# Patient Record
Sex: FEMALE | Race: WHITE | NOT HISPANIC OR LATINO | Employment: PART TIME | URBAN - METROPOLITAN AREA
[De-identification: names, ages, dates, MRNs, and addresses within clinical notes are randomized per-mention and may not be internally consistent; named-entity substitution may affect disease eponyms.]

---

## 2019-01-24 ENCOUNTER — OFFICE VISIT CONVERTED (OUTPATIENT)
Dept: SURGERY | Facility: CLINIC | Age: 24
End: 2019-01-24
Attending: PHYSICIAN ASSISTANT

## 2019-01-24 ENCOUNTER — HOSPITAL ENCOUNTER (OUTPATIENT)
Dept: SURGERY | Facility: CLINIC | Age: 24
Discharge: HOME | End: 2019-01-24
Attending: PHYSICIAN ASSISTANT

## 2019-01-26 LAB — BACTERIA UR CULT: NORMAL

## 2019-06-19 ENCOUNTER — HOSPITAL ENCOUNTER (OUTPATIENT)
Dept: URGENT CARE | Facility: CLINIC | Age: 24
Discharge: HOME | End: 2019-06-19

## 2019-06-21 ENCOUNTER — CONVERSION ENCOUNTER (OUTPATIENT)
Dept: FAMILY MEDICINE CLINIC | Facility: CLINIC | Age: 24
End: 2019-06-21

## 2019-06-21 ENCOUNTER — OFFICE VISIT CONVERTED (OUTPATIENT)
Dept: FAMILY MEDICINE CLINIC | Facility: CLINIC | Age: 24
End: 2019-06-21
Attending: NURSE PRACTITIONER

## 2019-06-21 LAB — BACTERIA SPEC AEROBE CULT: NORMAL

## 2019-09-11 ENCOUNTER — HOSPITAL ENCOUNTER (OUTPATIENT)
Dept: URGENT CARE | Facility: CLINIC | Age: 24
Discharge: HOME | End: 2019-09-11
Attending: PHYSICIAN ASSISTANT

## 2019-12-23 ENCOUNTER — HOSPITAL ENCOUNTER (OUTPATIENT)
Dept: LAB | Facility: HOSPITAL | Age: 24
Discharge: HOME | End: 2019-12-23
Attending: NURSE PRACTITIONER

## 2019-12-23 ENCOUNTER — OFFICE VISIT CONVERTED (OUTPATIENT)
Dept: FAMILY MEDICINE CLINIC | Facility: CLINIC | Age: 24
End: 2019-12-23
Attending: NURSE PRACTITIONER

## 2019-12-23 LAB — HCG INTACT+B SERPL-ACNC: 823.6 M[IU]/ML (ref 0–5)

## 2020-01-21 ENCOUNTER — HOSPITAL ENCOUNTER (OUTPATIENT)
Dept: OTHER | Facility: HOSPITAL | Age: 25
Discharge: HOME | End: 2020-01-21
Attending: OBSTETRICS & GYNECOLOGY

## 2020-01-23 LAB — BACTERIA UR CULT: NORMAL

## 2020-07-06 ENCOUNTER — HOSPITAL ENCOUNTER (OUTPATIENT)
Dept: INFUSION THERAPY | Facility: HOSPITAL | Age: 25
Discharge: HOME | End: 2020-07-06
Attending: OBSTETRICS & GYNECOLOGY

## 2020-07-06 LAB
ABO GROUP BLD: NORMAL
BLD GP AB SCN SERPL QL: NORMAL
CONV ABD CONTROL: NORMAL
RH BLD: NORMAL

## 2021-03-23 ENCOUNTER — OFFICE VISIT CONVERTED (OUTPATIENT)
Dept: FAMILY MEDICINE CLINIC | Facility: CLINIC | Age: 26
End: 2021-03-23
Attending: STUDENT IN AN ORGANIZED HEALTH CARE EDUCATION/TRAINING PROGRAM

## 2021-03-23 ENCOUNTER — HOSPITAL ENCOUNTER (OUTPATIENT)
Dept: LAB | Facility: HOSPITAL | Age: 26
Discharge: HOME | End: 2021-03-23
Attending: STUDENT IN AN ORGANIZED HEALTH CARE EDUCATION/TRAINING PROGRAM

## 2021-03-23 ENCOUNTER — CONVERSION ENCOUNTER (OUTPATIENT)
Dept: FAMILY MEDICINE CLINIC | Facility: CLINIC | Age: 26
End: 2021-03-23

## 2021-03-23 LAB
ALBUMIN SERPL-MCNC: 4.3 G/DL (ref 3.5–5)
ALBUMIN/GLOB SERPL: 1.7 {RATIO} (ref 1.4–2.6)
ALP SERPL-CCNC: 94 U/L (ref 42–98)
ALT SERPL-CCNC: 14 U/L (ref 10–40)
ANION GAP SERPL CALC-SCNC: 8 MMOL/L (ref 8–19)
AST SERPL-CCNC: 20 U/L (ref 15–50)
BILIRUB SERPL-MCNC: 0.55 MG/DL (ref 0.2–1.3)
BUN SERPL-MCNC: 10 MG/DL (ref 5–25)
BUN/CREAT SERPL: 17 {RATIO} (ref 6–20)
CALCIUM SERPL-MCNC: 9 MG/DL (ref 8.7–10.4)
CHLORIDE SERPL-SCNC: 105 MMOL/L (ref 99–111)
CONV CO2: 27 MMOL/L (ref 22–32)
CONV TOTAL PROTEIN: 6.9 G/DL (ref 6.3–8.2)
CREAT UR-MCNC: 0.59 MG/DL (ref 0.5–0.9)
GFR SERPLBLD BASED ON 1.73 SQ M-ARVRAT: >60 ML/MIN/{1.73_M2}
GLOBULIN UR ELPH-MCNC: 2.6 G/DL (ref 2–3.5)
GLUCOSE SERPL-MCNC: 80 MG/DL (ref 65–99)
HCG UR QL: NEGATIVE
LIPASE SERPL-CCNC: 23 U/L (ref 5–51)
OSMOLALITY SERPL CALC.SUM OF ELEC: 280 MOSM/KG (ref 273–304)
POTASSIUM SERPL-SCNC: 3.9 MMOL/L (ref 3.5–5.3)
SODIUM SERPL-SCNC: 136 MMOL/L (ref 135–147)

## 2021-04-05 ENCOUNTER — HOSPITAL ENCOUNTER (OUTPATIENT)
Dept: GENERAL RADIOLOGY | Facility: HOSPITAL | Age: 26
Discharge: HOME | End: 2021-04-05
Attending: STUDENT IN AN ORGANIZED HEALTH CARE EDUCATION/TRAINING PROGRAM

## 2021-05-14 VITALS
TEMPERATURE: 98.3 F | HEART RATE: 71 BPM | OXYGEN SATURATION: 98 % | WEIGHT: 126 LBS | RESPIRATION RATE: 16 BRPM | BODY MASS INDEX: 19.1 KG/M2 | DIASTOLIC BLOOD PRESSURE: 76 MMHG | SYSTOLIC BLOOD PRESSURE: 110 MMHG | HEIGHT: 68 IN

## 2021-05-14 NOTE — PROGRESS NOTES
Progress Note      Patient Name: Dixie Lawrence   Patient ID: [id number]   Sex: Female   YOB: 1995    Primary Care Provider: Zia ALBERT   Referring Provider: Zari WILSON    Visit Date: March 23, 2021    Provider: Sanjana Ha MD   [place] [facility] [place]   [place] Address: [address] [place] [address], [address] [place] [address]  [id number]   Location Phone: [phone]          Chief Complaint     Vomiting at night       History Of Present Illness  Dixie Lawrence is a 26 year old /White female who presents for evaluation and treatment of:      26 years old female comes to the clinic today for an acute visit.    Patient is complaining of on and off few months history of nausea and vomiting after heavy/greasy food intake.  Patient denies any fever, any abdominal pain or any urinary symptoms.  Patient reports symptoms get worse after fatty food.  Patient reports 2 days ago she had a same symptoms after she ate pizza in the evening.  Nonbloody nonbilious vomiting, normal bowel movements, no abdominal pain or fever or any urinary symptoms noted.    Patient reports she is breast-feeding/last delivery August/2020, started her menstrual cycle 3 days ago.       Past Medical History  Disease Name Date Onset Notes   Allergic rhinitis, chronic --  --    Bladder disorder --  --    Chronic UTI --  --    Dysuria --  --    Mood disorder --  --    Seasonal allergies --  --          Past Surgical History  Procedure Name Date Notes   *I have had no surgeries --  --    I have had no surgeries --  --          Allergy List  Allergen Name Date Reaction Notes   PENICILLINS --  --  --          Family Medical History  Disease Name Relative/Age Notes   Prostate cancer Grandfather (maternal)/   Grandfather (maternal)   Renal Cancer Father/40s  Grandfather (maternal)/70s   Father/40s; Grandfather (maternal)/70s   *No Known Family History  --          Social History  Finding Status  "Start/Stop Quantity Notes   Alcohol Current some day --/-- --  drinks monthly; wine, beer and liquor   Alcohol Use Current some day --/-- --  rarely drinks, less than 1 drink per day, has been drinking for less than 1 year   Caffeine Current every day --/-- --  drinks regularly; coffee, tea and soft drinks; 1-2 times per day   lives with other --  --/-- --  --    lives with parents --  --/-- --  --    Recreational Drug Use Never --/-- --  no   Second hand smoke exposure Current some day --/-- --  yes   Single. --  --/-- --  --    Student. --  --/-- --  --    Tobacco Never --/-- --  never a smoker  never smoker   Working --  --/-- --  --          Immunizations  NameDate Admin Mfg Trade Name Lot Number Route Inj VIS Given VIS Publication   InfluenzaRefused 06/21/2019 NE Not Entered  NE NE     Comments:          Review of Systems  · Constitutional  o Denies  o : fatigue, fever  · Eyes  o Denies  o : discharge from eye, redness  · HENT  o Denies  o : headaches, congestion  · Cardiovascular  o Denies  o : chest pain, palpitations  · Respiratory  o Denies  o : shortness of breath, wheezing, cough  · Gastrointestinal  o Admits  o : nausea, vomiting  o Denies  o : diarrhea, constipation  · Genitourinary  o Denies  o : dysuria, hematuria  · Integument  o Denies  o : rash, new skin lesions  · Neurologic  o Denies  o : altered mental status, seizures  · Musculoskeletal  o Denies  o : weakness, joint swelling  · Psychiatric  o Denies  o : anxiety, depression  · Heme-Lymph  o Denies  o : lymph node enlargement, tenderness      Vitals  Date Time BP Position Site L\R Cuff Size HR RR TEMP (F) WT  HT  BMI kg/m2 BSA m2 O2 Sat FR L/min FiO2 HC       03/23/2021 08:53 /76 Sitting    71 - R 16 98.3 125lbs 16oz 5'  8\" 19.16 1.66 98 %  21%          Physical Examination  · Constitutional  o Appearance  o : alert, in no acute distress, well developed, well-nourished  · Respiratory  o Auscultation of Lungs  o : normal breath sounds " throughout  · Cardiovascular  o Heart  o : Regular rate and rhythm, Normal S1,S2 , No cardiac murmers, No S3 or S4 gallop or rubs  · Gastrointestinal  o Abdominal Examination  o : abdomen soft, nontender, non distended, no rigidity, gaurding, rebound tenderness, no ventral or inguinal hernias present  o Liver and spleen  o : no hepatomegaly present, liver nontender to palpation, spleen not palpable  · Skin and Subcutaneous Tissue  o General Inspection  o : no rashes , or lesions present, normal skin color, warm and dry  o Digits and Nails  o : no clubbing, cyanosis, deformities or edema present, normal appearing nails  · Neurologic  o Mental Status Examination  o : alert and oriented to time, place, and person., Cranial Nerves: grossly intact,           Results  · In-Office Procedures  o Lab procedure  § Pregnancy Test (45006)   § B-HCG Ur Ql: Negative   § Internal Control Verified?: Yes       Assessment  · Nausea & vomiting     787.01/R11.2  · Food sensitivity with gastrointestinal symptoms       Other adverse food reactions, not elsewhere classified, initial encounter     693.1/T78.1XXA  · Breast feeding status of mother     V24.1/Z39.1       --Educated on lifestyle modification, diet change discussed with patient  --We will get the CMP/lipase and ultrasound of the gallbladder to rule out any possibility of gallstones  --Urine pregnancy is negative in the office   --Patient was advised to call the clinic if no improvement after diet modification or worsening or no improvement; consider as needed treatment if needed       Plan  · Orders  o CMP [facility] (18061) - 787.01/R11.2, 693.1/T78.1XXA - 03/23/2021  o ACO-14: Influenza immunization administered or previously received [facility] () - - 03/23/2021  o ACO-39: Current medications updated and reviewed (, 1159F) - - 03/23/2021  o Ultrasound liver and gall bladder (46324) - 787.01/R11.2, 693.1/T78.1XXA - 03/23/2021  o Lipase (47459) - 787.01/R11.2, 693.1/T78.1XXA -  03/23/2021  · Medications  o Macrodantin 50 mg oral capsule   SIG: take 1 capsule by oral route daily as needed   DISP: (0) capsule with 0 refills  Discontinued on 03/23/2021     o Medications have been Reconciled  o Transition of Care or Provider Policy  · Instructions  o Patient was educated/instructed on their diagnosis, treatment and medications prior to discharge from the clinic today.  o Patient was instructed to exercise regularly.  o Patient instructed to seek medical attention urgently for new or worsening symptoms.  o Call the office with any concerns or questions.  o Bring all medicines with their bottles to each office visit.  o Minutes spent with patient including greater than 50% in Education/Counseling/Care Coordination.  o Time spent with the patient was minutes, more than 50% face to face.  o Discussed Covid-19 precautions including, but not limited to, social distancing, avoid touching your face, and hand washing.   · Disposition  o Call or Return if symptoms worsen or persist.  o Follow Up PRN.            Electronically Signed by: Sanjana Ha MD -Author on March 23, 2021 11:46:48 AM

## 2021-05-15 VITALS
TEMPERATURE: 99.5 F | OXYGEN SATURATION: 98 % | BODY MASS INDEX: 19.55 KG/M2 | RESPIRATION RATE: 16 BRPM | HEIGHT: 68 IN | HEART RATE: 95 BPM | WEIGHT: 129 LBS | DIASTOLIC BLOOD PRESSURE: 80 MMHG | SYSTOLIC BLOOD PRESSURE: 128 MMHG

## 2021-05-15 VITALS
TEMPERATURE: 98.6 F | SYSTOLIC BLOOD PRESSURE: 122 MMHG | HEIGHT: 68 IN | HEART RATE: 70 BPM | OXYGEN SATURATION: 100 % | DIASTOLIC BLOOD PRESSURE: 58 MMHG | RESPIRATION RATE: 16 BRPM | WEIGHT: 136 LBS | BODY MASS INDEX: 20.61 KG/M2

## 2021-05-15 VITALS — HEIGHT: 68 IN | BODY MASS INDEX: 21.29 KG/M2 | WEIGHT: 140.5 LBS | RESPIRATION RATE: 16 BRPM

## 2021-07-21 ENCOUNTER — TELEPHONE (OUTPATIENT)
Dept: FAMILY MEDICINE CLINIC | Facility: CLINIC | Age: 26
End: 2021-07-21

## 2021-07-21 DIAGNOSIS — R30.0 DYSURIA: Primary | ICD-10-CM

## 2021-07-21 NOTE — TELEPHONE ENCOUNTER
Caller: Dixie Lawrence    Relationship: Self    Best call back number: [phone]    What medication are you requesting: MACRODANTIN 50MG    What are your current symptoms: PAINFUL URINATION    How long have you been experiencing symptoms: 07/20/2021    Have you had these symptoms before:    [x] Yes  [] No    Have you been treated for these symptoms before:   [x] Yes  [] No    If a prescription is needed, what is your preferred pharmacy and phone number: [address] [phone] [facility] [phone]      Additional notes: THE PATIENT STATED SHE HAD RECURRING UTI'S THAT SHE WAS SEEING A UROLOGIST FOR SEVERAL YEARS AGO. HER UTI'S ARE STARTING TO COME BACK AGAIN AND SHE WOULD LIKE TO KNOW IF PCP CAN REFILL HER MEDICATION FOR THIS. SHE WOULD LIKE A CALL BACK IF AN APPOINTMENT IS NEEDED OR IF A REFERRAL IS NEEDED IS FIRST. SHE WOULD LIKE A CALL BACK TO VERIFY THE MEDICATION HAS BEEN APPROVED AND SENT TO THE PHARMACY.

## 2021-09-03 ENCOUNTER — LAB (OUTPATIENT)
Dept: LAB | Facility: HOSPITAL | Age: 26
End: 2021-09-03

## 2021-09-03 DIAGNOSIS — R30.0 DYSURIA: ICD-10-CM

## 2021-09-03 LAB
BACTERIA UR QL AUTO: ABNORMAL /HPF
BILIRUB UR QL STRIP: NEGATIVE
CLARITY UR: CLEAR
COLOR UR: YELLOW
GLUCOSE UR STRIP-MCNC: NEGATIVE MG/DL
HGB UR QL STRIP.AUTO: ABNORMAL
HYALINE CASTS UR QL AUTO: ABNORMAL /LPF
KETONES UR QL STRIP: NEGATIVE
LEUKOCYTE ESTERASE UR QL STRIP.AUTO: NEGATIVE
NITRITE UR QL STRIP: NEGATIVE
PH UR STRIP.AUTO: 8.5 [PH] (ref 5–8)
PROT UR QL STRIP: NEGATIVE
RBC # UR: ABNORMAL /HPF
REF LAB TEST METHOD: ABNORMAL
SP GR UR STRIP: 1.02 (ref 1–1.03)
SQUAMOUS #/AREA URNS HPF: ABNORMAL /HPF
UROBILINOGEN UR QL STRIP: ABNORMAL
WBC UR QL AUTO: ABNORMAL /HPF

## 2021-09-03 PROCEDURE — 81001 URINALYSIS AUTO W/SCOPE: CPT

## 2021-09-09 ENCOUNTER — LAB (OUTPATIENT)
Dept: LAB | Facility: HOSPITAL | Age: 26
End: 2021-09-09

## 2021-09-09 ENCOUNTER — TRANSCRIBE ORDERS (OUTPATIENT)
Dept: LAB | Facility: HOSPITAL | Age: 26
End: 2021-09-09

## 2021-09-09 DIAGNOSIS — N92.1 METRORRHAGIA: Primary | ICD-10-CM

## 2021-09-09 DIAGNOSIS — N92.1 METRORRHAGIA: ICD-10-CM

## 2021-09-09 LAB — HCG INTACT+B SERPL-ACNC: <0.5 MIU/ML

## 2021-09-09 PROCEDURE — 84702 CHORIONIC GONADOTROPIN TEST: CPT

## 2021-09-10 ENCOUNTER — TELEPHONE (OUTPATIENT)
Dept: FAMILY MEDICINE CLINIC | Facility: CLINIC | Age: 26
End: 2021-09-10

## 2021-09-10 NOTE — TELEPHONE ENCOUNTER
Caller: Dixie Lawrence    Relationship: Self    Best call back number: [phone]     CALLER ATTEMPTING TO RETURN CALL TO [facility]. PLEASE ADVISE

## 2021-11-09 ENCOUNTER — LAB REQUISITION (OUTPATIENT)
Dept: LAB | Facility: HOSPITAL | Age: 26
End: 2021-11-09

## 2021-11-09 DIAGNOSIS — Z34.81 ENCOUNTER FOR SUPERVISION OF OTHER NORMAL PREGNANCY, FIRST TRIMESTER: ICD-10-CM

## 2021-11-09 LAB
EXTERNAL HEPATITIS B SURFACE ANTIGEN: NEGATIVE
EXTERNAL HEPATITIS C AB: NEGATIVE
EXTERNAL RUBELLA QUALITATIVE: NORMAL
EXTERNAL VDRL: NORMAL
HIV1 P24 AG SERPL QL IA: NEGATIVE

## 2021-11-09 PROCEDURE — 87086 URINE CULTURE/COLONY COUNT: CPT | Performed by: OBSTETRICS & GYNECOLOGY

## 2021-11-10 LAB — EXTERNAL CHLAMYDIA SCREEN: NORMAL

## 2021-11-11 LAB — BACTERIA SPEC AEROBE CULT: NORMAL

## 2022-04-04 ENCOUNTER — TRANSCRIBE ORDERS (OUTPATIENT)
Dept: ADMINISTRATIVE | Facility: HOSPITAL | Age: 27
End: 2022-04-04

## 2022-04-04 DIAGNOSIS — Z67.41 TYPE O BLOOD, RH NEGATIVE: Primary | ICD-10-CM

## 2022-04-07 ENCOUNTER — TRANSCRIBE ORDERS (OUTPATIENT)
Dept: LAB | Facility: HOSPITAL | Age: 27
End: 2022-04-07

## 2022-04-07 ENCOUNTER — LAB (OUTPATIENT)
Dept: LAB | Facility: HOSPITAL | Age: 27
End: 2022-04-07

## 2022-04-07 ENCOUNTER — HOSPITAL ENCOUNTER (OUTPATIENT)
Dept: INFUSION THERAPY | Facility: HOSPITAL | Age: 27
Discharge: HOME | End: 2022-04-07

## 2022-04-07 VITALS
HEIGHT: 67 IN | SYSTOLIC BLOOD PRESSURE: 115 MMHG | TEMPERATURE: 98.9 F | OXYGEN SATURATION: 100 % | WEIGHT: 137.13 LBS | DIASTOLIC BLOOD PRESSURE: 57 MMHG | RESPIRATION RATE: 16 BRPM | HEART RATE: 74 BPM | BODY MASS INDEX: 21.52 KG/M2

## 2022-04-07 DIAGNOSIS — Z67.91 RH NEGATIVE STATE IN ANTEPARTUM PERIOD, THIRD TRIMESTER: ICD-10-CM

## 2022-04-07 DIAGNOSIS — O26.892 RH NEGATIVE STATUS DURING PREGNANCY IN SECOND TRIMESTER: Primary | ICD-10-CM

## 2022-04-07 DIAGNOSIS — O26.893 RH NEGATIVE STATE IN ANTEPARTUM PERIOD, THIRD TRIMESTER: ICD-10-CM

## 2022-04-07 DIAGNOSIS — Z67.91 RH NEGATIVE STATUS DURING PREGNANCY IN SECOND TRIMESTER: Primary | ICD-10-CM

## 2022-04-07 DIAGNOSIS — O26.893 RH NEGATIVE STATE IN ANTEPARTUM PERIOD, THIRD TRIMESTER: Primary | ICD-10-CM

## 2022-04-07 DIAGNOSIS — Z67.91 RH NEGATIVE STATE IN ANTEPARTUM PERIOD, THIRD TRIMESTER: Primary | ICD-10-CM

## 2022-04-07 PROBLEM — O26.899 RH NEGATIVE STATUS DURING PREGNANCY: Status: ACTIVE | Noted: 2022-04-07

## 2022-04-07 LAB
ABO GROUP BLD: NORMAL
BLD GP AB SCN SERPL QL: NEGATIVE
NUMBER OF DOSES: NORMAL
RH BLD: NEGATIVE

## 2022-04-07 PROCEDURE — 36415 COLL VENOUS BLD VENIPUNCTURE: CPT

## 2022-04-07 PROCEDURE — 96372 THER/PROPH/DIAG INJ SC/IM: CPT

## 2022-04-07 PROCEDURE — 86850 RBC ANTIBODY SCREEN: CPT

## 2022-04-07 PROCEDURE — 86900 BLOOD TYPING SEROLOGIC ABO: CPT

## 2022-04-07 PROCEDURE — 25010000002 RHO D IMMUNE GLOBULIN 1500 UNIT/2ML SOLUTION PREFILLED SYRINGE: Performed by: OBSTETRICS & GYNECOLOGY

## 2022-04-07 PROCEDURE — 86901 BLOOD TYPING SEROLOGIC RH(D): CPT

## 2022-04-07 RX ORDER — PRENATAL VIT NO.126/IRON/FOLIC 28MG-0.8MG
1 TABLET ORAL DAILY
COMMUNITY

## 2022-04-07 RX ORDER — CALCIUM CARBONATE 200(500)MG
1 TABLET,CHEWABLE ORAL DAILY
COMMUNITY

## 2022-04-07 RX ADMIN — HUMAN RHO(D) IMMUNE GLOBULIN 1500 UNITS: 1500 SOLUTION INTRAMUSCULAR; INTRAVENOUS at 14:34

## 2022-05-10 LAB
EXTERNAL GBS SUSCEPTIBILITY TO CLINDAMYCIN: NO
EXTERNAL GROUP B STREP ANTIGEN: NORMAL
Lab: NO

## 2022-05-26 ENCOUNTER — HOSPITAL ENCOUNTER (INPATIENT)
Facility: HOSPITAL | Age: 27
LOS: 2 days | Discharge: HOME | End: 2022-05-28
Attending: OBSTETRICS & GYNECOLOGY | Admitting: OBSTETRICS & GYNECOLOGY

## 2022-05-26 ENCOUNTER — ANESTHESIA EVENT (OUTPATIENT)
Dept: LABOR AND DELIVERY | Facility: HOSPITAL | Age: 27
End: 2022-05-26

## 2022-05-26 ENCOUNTER — ANESTHESIA (OUTPATIENT)
Dept: LABOR AND DELIVERY | Facility: HOSPITAL | Age: 27
End: 2022-05-26

## 2022-05-26 LAB
A1 MICROGLOB PLACENTAL VAG QL: POSITIVE
ABO GROUP BLD: NORMAL
ALBUMIN SERPL-MCNC: 3.5 G/DL (ref 3.5–5.2)
ALBUMIN/GLOB SERPL: 1.1 G/DL
ALP SERPL-CCNC: 118 U/L (ref 39–117)
ALT SERPL W P-5'-P-CCNC: 13 U/L (ref 1–33)
ANION GAP SERPL CALCULATED.3IONS-SCNC: 13.2 MMOL/L (ref 5–15)
ANTI-D, PASSIVE: NORMAL
AST SERPL-CCNC: 21 U/L (ref 1–32)
BASE EXCESS BLDCOA CALC-SCNC: -6.4 MMOL/L
BASE EXCESS BLDCOV CALC-SCNC: -5.4 MMOL/L
BILIRUB SERPL-MCNC: <0.2 MG/DL (ref 0–1.2)
BLD GP AB SCN SERPL QL: POSITIVE
BUN SERPL-MCNC: 5 MG/DL (ref 6–20)
BUN/CREAT SERPL: 10.2 (ref 7–25)
CALCIUM SPEC-SCNC: 9.4 MG/DL (ref 8.6–10.5)
CHLORIDE SERPL-SCNC: 102 MMOL/L (ref 98–107)
CO2 SERPL-SCNC: 20.8 MMOL/L (ref 22–29)
CREAT SERPL-MCNC: 0.49 MG/DL (ref 0.57–1)
DEPRECATED RDW RBC AUTO: 37.1 FL (ref 37–54)
EGFRCR SERPLBLD CKD-EPI 2021: 132.7 ML/MIN/1.73
ERYTHROCYTE [DISTWIDTH] IN BLOOD BY AUTOMATED COUNT: 12.2 % (ref 12.3–15.4)
GLOBULIN UR ELPH-MCNC: 3.2 GM/DL
GLUCOSE SERPL-MCNC: 96 MG/DL (ref 65–99)
HCO3 BLDCOA-SCNC: 16.8 MMOL/L
HCO3 BLDCOV-SCNC: 21 MMOL/L
HCT VFR BLD AUTO: 32.4 % (ref 34–46.6)
HGB BLD-MCNC: 10.8 G/DL (ref 12–15.9)
MCH RBC QN AUTO: 28.5 PG (ref 26.6–33)
MCHC RBC AUTO-ENTMCNC: 33.3 G/DL (ref 31.5–35.7)
MCV RBC AUTO: 85.5 FL (ref 79–97)
PCO2 BLDCOA: 27.9 MMHG (ref 33–49)
PCO2 BLDCOV: 43.8 MM HG (ref 28–40)
PH BLDCOA: 7.4 PH UNITS (ref 7.21–7.31)
PH BLDCOV: 7.3 PH UNITS (ref 7.31–7.37)
PLATELET # BLD AUTO: 246 10*3/MM3 (ref 140–450)
PMV BLD AUTO: 11.6 FL (ref 6–12)
PO2 BLDCOA: <40.5 MMHG
PO2 BLDCOV: <40.5 MM HG (ref 21–31)
POTASSIUM SERPL-SCNC: 3.7 MMOL/L (ref 3.5–5.2)
PROT SERPL-MCNC: 6.7 G/DL (ref 6–8.5)
RBC # BLD AUTO: 3.79 10*6/MM3 (ref 3.77–5.28)
RH BLD: NEGATIVE
SARS-COV-2 RNA PNL SPEC NAA+PROBE: NOT DETECTED
SODIUM SERPL-SCNC: 136 MMOL/L (ref 136–145)
T&S EXPIRATION DATE: NORMAL
WBC NRBC COR # BLD: 10.58 10*3/MM3 (ref 3.4–10.8)

## 2022-05-26 PROCEDURE — 86850 RBC ANTIBODY SCREEN: CPT | Performed by: OBSTETRICS & GYNECOLOGY

## 2022-05-26 PROCEDURE — 88307 TISSUE EXAM BY PATHOLOGIST: CPT | Performed by: OBSTETRICS & GYNECOLOGY

## 2022-05-26 PROCEDURE — 84112 EVAL AMNIOTIC FLUID PROTEIN: CPT | Performed by: OBSTETRICS & GYNECOLOGY

## 2022-05-26 PROCEDURE — C9803 HOPD COVID-19 SPEC COLLECT: HCPCS

## 2022-05-26 PROCEDURE — 86900 BLOOD TYPING SEROLOGIC ABO: CPT | Performed by: OBSTETRICS & GYNECOLOGY

## 2022-05-26 PROCEDURE — 86870 RBC ANTIBODY IDENTIFICATION: CPT | Performed by: OBSTETRICS & GYNECOLOGY

## 2022-05-26 PROCEDURE — 86901 BLOOD TYPING SEROLOGIC RH(D): CPT | Performed by: OBSTETRICS & GYNECOLOGY

## 2022-05-26 PROCEDURE — 85027 COMPLETE CBC AUTOMATED: CPT | Performed by: OBSTETRICS & GYNECOLOGY

## 2022-05-26 PROCEDURE — U0004 COV-19 TEST NON-CDC HGH THRU: HCPCS | Performed by: OBSTETRICS & GYNECOLOGY

## 2022-05-26 PROCEDURE — 82803 BLOOD GASES ANY COMBINATION: CPT | Performed by: OBSTETRICS & GYNECOLOGY

## 2022-05-26 PROCEDURE — 0HQ9XZZ REPAIR PERINEUM SKIN, EXTERNAL APPROACH: ICD-10-PCS | Performed by: OBSTETRICS & GYNECOLOGY

## 2022-05-26 PROCEDURE — 25010000002 VANCOMYCIN 5 G RECONSTITUTED SOLUTION: Performed by: OBSTETRICS & GYNECOLOGY

## 2022-05-26 PROCEDURE — 25010000002 FENTANYL CITRATE (PF) 50 MCG/ML SOLUTION: Performed by: ANESTHESIOLOGY

## 2022-05-26 PROCEDURE — 25010000002 ROPIVACAINE PER 1 MG: Performed by: ANESTHESIOLOGY

## 2022-05-26 PROCEDURE — 10H07YZ INSERTION OF OTHER DEVICE INTO PRODUCTS OF CONCEPTION, VIA NATURAL OR ARTIFICIAL OPENING: ICD-10-PCS | Performed by: OBSTETRICS & GYNECOLOGY

## 2022-05-26 PROCEDURE — 80053 COMPREHEN METABOLIC PANEL: CPT | Performed by: OBSTETRICS & GYNECOLOGY

## 2022-05-26 PROCEDURE — C1755 CATHETER, INTRASPINAL: HCPCS | Performed by: ANESTHESIOLOGY

## 2022-05-26 PROCEDURE — 51702 INSERT TEMP BLADDER CATH: CPT

## 2022-05-26 RX ORDER — FENTANYL CITRATE 50 UG/ML
INJECTION, SOLUTION INTRAMUSCULAR; INTRAVENOUS
Status: COMPLETED | OUTPATIENT
Start: 2022-05-26 | End: 2022-05-26

## 2022-05-26 RX ORDER — CARBOPROST TROMETHAMINE 250 UG/ML
250 INJECTION, SOLUTION INTRAMUSCULAR
Status: DISCONTINUED | OUTPATIENT
Start: 2022-05-26 | End: 2022-05-26 | Stop reason: HOSPADM

## 2022-05-26 RX ORDER — IBUPROFEN 600 MG/1
600 TABLET ORAL EVERY 6 HOURS SCHEDULED
Status: DISCONTINUED | OUTPATIENT
Start: 2022-05-26 | End: 2022-05-28 | Stop reason: HOSPADM

## 2022-05-26 RX ORDER — MAGNESIUM CARB/ALUMINUM HYDROX 105-160MG
30 TABLET,CHEWABLE ORAL ONCE
Status: DISCONTINUED | OUTPATIENT
Start: 2022-05-26 | End: 2022-05-26 | Stop reason: HOSPADM

## 2022-05-26 RX ORDER — MISOPROSTOL 200 UG/1
800 TABLET ORAL ONCE AS NEEDED
Status: DISCONTINUED | OUTPATIENT
Start: 2022-05-26 | End: 2022-05-26 | Stop reason: HOSPADM

## 2022-05-26 RX ORDER — METHYLERGONOVINE MALEATE 0.2 MG/ML
200 INJECTION INTRAVENOUS ONCE AS NEEDED
Status: DISCONTINUED | OUTPATIENT
Start: 2022-05-26 | End: 2022-05-26 | Stop reason: HOSPADM

## 2022-05-26 RX ORDER — ROPIVACAINE HYDROCHLORIDE 2 MG/ML
INJECTION, SOLUTION EPIDURAL; INFILTRATION; PERINEURAL
Status: COMPLETED
Start: 2022-05-26 | End: 2022-05-26

## 2022-05-26 RX ORDER — EPHEDRINE SULFATE 50 MG/ML
5 INJECTION, SOLUTION INTRAVENOUS
Status: DISCONTINUED | OUTPATIENT
Start: 2022-05-26 | End: 2022-05-26 | Stop reason: HOSPADM

## 2022-05-26 RX ORDER — ONDANSETRON 4 MG/1
4 TABLET, FILM COATED ORAL EVERY 8 HOURS PRN
Status: DISCONTINUED | OUTPATIENT
Start: 2022-05-26 | End: 2022-05-28 | Stop reason: HOSPADM

## 2022-05-26 RX ORDER — OXYTOCIN/0.9 % SODIUM CHLORIDE 30/500 ML
1 PLASTIC BAG, INJECTION (ML) INTRAVENOUS
Status: DISCONTINUED | OUTPATIENT
Start: 2022-05-26 | End: 2022-05-26

## 2022-05-26 RX ORDER — LIDOCAINE HYDROCHLORIDE AND EPINEPHRINE 15; 5 MG/ML; UG/ML
INJECTION, SOLUTION EPIDURAL
Status: COMPLETED | OUTPATIENT
Start: 2022-05-26 | End: 2022-05-26

## 2022-05-26 RX ORDER — MISOPROSTOL 200 UG/1
600 TABLET ORAL ONCE
Status: COMPLETED | OUTPATIENT
Start: 2022-05-26 | End: 2022-05-26

## 2022-05-26 RX ORDER — FENTANYL CITRATE 50 UG/ML
INJECTION, SOLUTION INTRAMUSCULAR; INTRAVENOUS
Status: COMPLETED
Start: 2022-05-26 | End: 2022-05-26

## 2022-05-26 RX ORDER — SODIUM CHLORIDE 0.9 % (FLUSH) 0.9 %
10 SYRINGE (ML) INJECTION AS NEEDED
Status: DISCONTINUED | OUTPATIENT
Start: 2022-05-26 | End: 2022-05-26 | Stop reason: HOSPADM

## 2022-05-26 RX ORDER — CALCIUM CARBONATE 200(500)MG
1 TABLET,CHEWABLE ORAL 3 TIMES DAILY PRN
Status: DISCONTINUED | OUTPATIENT
Start: 2022-05-26 | End: 2022-05-28 | Stop reason: HOSPADM

## 2022-05-26 RX ORDER — HYDROCODONE BITARTRATE AND ACETAMINOPHEN 5; 325 MG/1; MG/1
1 TABLET ORAL EVERY 4 HOURS PRN
Status: DISCONTINUED | OUTPATIENT
Start: 2022-05-26 | End: 2022-05-28 | Stop reason: HOSPADM

## 2022-05-26 RX ORDER — DOCUSATE SODIUM 100 MG/1
100 CAPSULE, LIQUID FILLED ORAL DAILY
Status: DISCONTINUED | OUTPATIENT
Start: 2022-05-26 | End: 2022-05-28 | Stop reason: HOSPADM

## 2022-05-26 RX ORDER — FERROUS SULFATE 325(65) MG
325 TABLET ORAL 2 TIMES DAILY WITH MEALS
Status: DISCONTINUED | OUTPATIENT
Start: 2022-05-26 | End: 2022-05-28 | Stop reason: HOSPADM

## 2022-05-26 RX ORDER — LIDOCAINE HYDROCHLORIDE 10 MG/ML
INJECTION, SOLUTION EPIDURAL; INFILTRATION; INTRACAUDAL; PERINEURAL
Status: DISPENSED
Start: 2022-05-26 | End: 2022-05-27

## 2022-05-26 RX ORDER — ROPIVACAINE HYDROCHLORIDE 2 MG/ML
INJECTION, SOLUTION EPIDURAL; INFILTRATION; PERINEURAL
Status: COMPLETED | OUTPATIENT
Start: 2022-05-26 | End: 2022-05-26

## 2022-05-26 RX ORDER — SODIUM CHLORIDE, SODIUM LACTATE, POTASSIUM CHLORIDE, CALCIUM CHLORIDE 600; 310; 30; 20 MG/100ML; MG/100ML; MG/100ML; MG/100ML
125 INJECTION, SOLUTION INTRAVENOUS CONTINUOUS
Status: DISCONTINUED | OUTPATIENT
Start: 2022-05-26 | End: 2022-05-26

## 2022-05-26 RX ORDER — OXYTOCIN/0.9 % SODIUM CHLORIDE 30/500 ML
125 PLASTIC BAG, INJECTION (ML) INTRAVENOUS ONCE
Status: COMPLETED | OUTPATIENT
Start: 2022-05-26 | End: 2022-05-26

## 2022-05-26 RX ADMIN — DOCUSATE SODIUM 100 MG: 100 CAPSULE, LIQUID FILLED ORAL at 20:23

## 2022-05-26 RX ADMIN — VANCOMYCIN HYDROCHLORIDE 1000 MG: 5 INJECTION, POWDER, LYOPHILIZED, FOR SOLUTION INTRAVENOUS at 05:17

## 2022-05-26 RX ADMIN — IBUPROFEN 600 MG: 600 TABLET ORAL at 20:23

## 2022-05-26 RX ADMIN — ROPIVACAINE HYDROCHLORIDE 5 ML: 2 INJECTION, SOLUTION EPIDURAL; INFILTRATION; PERINEURAL at 11:13

## 2022-05-26 RX ADMIN — SODIUM CHLORIDE, POTASSIUM CHLORIDE, SODIUM LACTATE AND CALCIUM CHLORIDE 1000 ML: 600; 310; 30; 20 INJECTION, SOLUTION INTRAVENOUS at 11:53

## 2022-05-26 RX ADMIN — WITCH HAZEL 1 PAD: 500 SOLUTION RECTAL; TOPICAL at 20:32

## 2022-05-26 RX ADMIN — MISOPROSTOL 600 MCG: 200 TABLET ORAL at 20:23

## 2022-05-26 RX ADMIN — FENTANYL CITRATE 100 MCG: 50 INJECTION, SOLUTION INTRAMUSCULAR; INTRAVENOUS at 11:13

## 2022-05-26 RX ADMIN — Medication 125 ML/HR: at 15:06

## 2022-05-26 RX ADMIN — Medication 10 ML/HR: at 11:40

## 2022-05-26 RX ADMIN — Medication: at 20:33

## 2022-05-26 RX ADMIN — FERROUS SULFATE TAB 325 MG (65 MG ELEMENTAL FE) 325 MG: 325 (65 FE) TAB at 20:23

## 2022-05-26 RX ADMIN — LIDOCAINE HYDROCHLORIDE AND EPINEPHRINE 3 ML: 15; 5 INJECTION, SOLUTION EPIDURAL at 11:13

## 2022-05-26 RX ADMIN — SODIUM CHLORIDE, POTASSIUM CHLORIDE, SODIUM LACTATE AND CALCIUM CHLORIDE 125 ML/HR: 600; 310; 30; 20 INJECTION, SOLUTION INTRAVENOUS at 05:22

## 2022-05-26 NOTE — ANESTHESIA PREPROCEDURE EVALUATION
Anesthesia Evaluation     Patient summary reviewed and Nursing notes reviewed   no history of anesthetic complications:  NPO Solid Status: > 8 hours  NPO Liquid Status: > 2 hours           Airway   Mallampati: II  TM distance: >3 FB  Neck ROM: full  No difficulty expected  Dental      Pulmonary - negative pulmonary ROS and normal exam    breath sounds clear to auscultation  Cardiovascular - negative cardio ROS and normal exam  Exercise tolerance: good (4-7 METS)    Rhythm: regular  Rate: normal        Neuro/Psych- negative ROS  GI/Hepatic/Renal/Endo    (+)  GERD well controlled,  diabetes mellitus,     Musculoskeletal (-) negative ROS    Abdominal    Substance History - negative use     OB/GYN    (+) Pregnant,         Other - negative ROS                       Anesthesia Plan    ASA 2     regional   (Patient understands anesthesia not responsible for dental damage.)    Anesthetic plan, all risks, benefits, and alternatives have been provided, discussed and informed consent has been obtained with: patient.  Use of blood products discussed with patient .       CODE STATUS:

## 2022-05-26 NOTE — ANESTHESIA PROCEDURE NOTES
Labor Epidural      Patient reassessed immediately prior to procedure    Patient location during procedure: OB  Start Time: 5/26/2022 11:28 AM  Performed By  Anesthesiologist: Reina Padron MD  Preanesthetic Checklist  Completed: patient identified, IV checked, risks and benefits discussed, surgical consent, monitors and equipment checked, pre-op evaluation and timeout performed  Prep:  Pt Position:sitting  Sterile Tech:cap, gloves, sterile barrier, mask and gown  Prep:chlorhexidine gluconate and isopropyl alcohol  Monitoring:blood pressure monitoring, continuous pulse oximetry and EKG  Epidural Block Procedure:  Approach:midline  Guidance:landmark technique and palpation technique  Location:L3-L4  Needle Type:Tuohy  Needle Gauge:17 G  Loss of Resistance Medium: air  Paresthesia: none  Aspiration:negative  Test Dose:negative  Medication: ropivacaine (NAROPIN) 0.2 % injection, 5 mL  fentaNYL citrate (PF) (SUBLIMAZE) injection, 100 mcg  lidocaine 1.5%-EPINEPHrine 1:200,000 (XYLOCAINE W/EPI) injection, 3 mL  Med administered at 5/26/2022 11:13 AM  Number of Attempts: 1  Post Assessment:  Dressing:occlusive dressing applied and secured with tape  Pt Tolerance:patient tolerated the procedure well with no apparent complications  Complications:no

## 2022-05-27 LAB
ABO GROUP BLD: NORMAL
BLD GP AB SCN SERPL QL: POSITIVE
FETAL BLEED: NEGATIVE
NUMBER OF DOSES: NORMAL
RH BLD: NEGATIVE

## 2022-05-27 PROCEDURE — 85461 HEMOGLOBIN FETAL: CPT | Performed by: OBSTETRICS & GYNECOLOGY

## 2022-05-27 PROCEDURE — 86900 BLOOD TYPING SEROLOGIC ABO: CPT | Performed by: OBSTETRICS & GYNECOLOGY

## 2022-05-27 PROCEDURE — 25010000002 RHO D IMMUNE GLOBULIN 1500 UNIT/2ML SOLUTION PREFILLED SYRINGE: Performed by: OBSTETRICS & GYNECOLOGY

## 2022-05-27 PROCEDURE — 86901 BLOOD TYPING SEROLOGIC RH(D): CPT | Performed by: OBSTETRICS & GYNECOLOGY

## 2022-05-27 RX ADMIN — IBUPROFEN 600 MG: 600 TABLET ORAL at 13:14

## 2022-05-27 RX ADMIN — IBUPROFEN 600 MG: 600 TABLET ORAL at 02:22

## 2022-05-27 RX ADMIN — IBUPROFEN 600 MG: 600 TABLET ORAL at 23:46

## 2022-05-27 RX ADMIN — FERROUS SULFATE TAB 325 MG (65 MG ELEMENTAL FE) 325 MG: 325 (65 FE) TAB at 07:54

## 2022-05-27 RX ADMIN — IBUPROFEN 600 MG: 600 TABLET ORAL at 17:59

## 2022-05-27 RX ADMIN — DOCUSATE SODIUM 100 MG: 100 CAPSULE, LIQUID FILLED ORAL at 07:55

## 2022-05-27 RX ADMIN — IBUPROFEN 600 MG: 600 TABLET ORAL at 07:55

## 2022-05-27 RX ADMIN — HUMAN RHO(D) IMMUNE GLOBULIN 1500 UNITS: 1500 SOLUTION INTRAMUSCULAR; INTRAVENOUS at 15:54

## 2022-05-27 RX ADMIN — FERROUS SULFATE TAB 325 MG (65 MG ELEMENTAL FE) 325 MG: 325 (65 FE) TAB at 17:59

## 2022-05-27 NOTE — ANESTHESIA POSTPROCEDURE EVALUATION
Patient: Dixie Lawrence    Procedure Summary     Date: 05/26/22 Room / Location:     Anesthesia Start: 1128 Anesthesia Stop: 1500    Procedure: LABOR ANALGESIA Diagnosis:     Scheduled Providers:  Provider: Reina Padron MD    Anesthesia Type: regional ASA Status: 2          Anesthesia Type: regional    Vitals  Vitals Value Taken Time   /79 05/27/22 0510   Temp 36.9 °C (98.4 °F) 05/27/22 0510   Pulse 55 05/27/22 0510   Resp 20 05/27/22 0510   SpO2 100 % 05/26/22 1443   Vitals shown include unvalidated device data.        Post Anesthesia Care and Evaluation    Patient location during evaluation: bedside  Patient participation: complete - patient participated  Level of consciousness: awake  Pain management: adequate  Airway patency: patent  Anesthetic complications: No anesthetic complications  PONV Status: none  Cardiovascular status: acceptable  Respiratory status: acceptable  Hydration status: acceptable    Comments: An Anesthesiologist personally participated in the most demanding procedures (including induction and emergence if applicable) in the anesthesia plan, monitored the course of anesthesia administration at frequent intervals and remained physically present and available for immediate diagnosis and treatment of emergencies.

## 2022-05-28 VITALS
RESPIRATION RATE: 18 BRPM | WEIGHT: 144 LBS | BODY MASS INDEX: 21.82 KG/M2 | SYSTOLIC BLOOD PRESSURE: 120 MMHG | OXYGEN SATURATION: 100 % | DIASTOLIC BLOOD PRESSURE: 62 MMHG | HEIGHT: 68 IN | HEART RATE: 82 BPM | TEMPERATURE: 98.4 F

## 2022-05-28 RX ORDER — IBUPROFEN 600 MG/1
600 TABLET ORAL EVERY 6 HOURS PRN
Qty: 30 TABLET | Refills: 0 | Status: SHIPPED | OUTPATIENT
Start: 2022-05-28

## 2022-05-28 RX ADMIN — IBUPROFEN 600 MG: 600 TABLET ORAL at 05:46

## 2022-05-28 RX ADMIN — IBUPROFEN 600 MG: 600 TABLET ORAL at 12:15

## 2022-05-28 RX ADMIN — DOCUSATE SODIUM 100 MG: 100 CAPSULE, LIQUID FILLED ORAL at 09:07

## 2022-05-28 RX ADMIN — FERROUS SULFATE TAB 325 MG (65 MG ELEMENTAL FE) 325 MG: 325 (65 FE) TAB at 09:07

## 2022-06-01 LAB
CYTO UR: NORMAL
LAB AP CASE REPORT: NORMAL
LAB AP CLINICAL INFORMATION: NORMAL
PATH REPORT.FINAL DX SPEC: NORMAL
PATH REPORT.GROSS SPEC: NORMAL

## 2022-06-05 ENCOUNTER — TELEPHONE (OUTPATIENT)
Dept: OBSTETRICS AND GYNECOLOGY | Facility: HOSPITAL | Age: 27
End: 2022-06-05

## 2022-06-06 NOTE — TELEPHONE ENCOUNTER
Pt states her milk is in, baby is gulping at breast, she is pumping some and gets 4-6oz, baby is above birth wt again. She has no questions or concerns. Encouraged her reach out for Lc assistance as needed.

## 2022-06-09 ENCOUNTER — LAB REQUISITION (OUTPATIENT)
Dept: LAB | Facility: HOSPITAL | Age: 27
End: 2022-06-09

## 2022-06-09 DIAGNOSIS — R30.0 DYSURIA: ICD-10-CM

## 2022-06-09 PROCEDURE — 87086 URINE CULTURE/COLONY COUNT: CPT | Performed by: OBSTETRICS & GYNECOLOGY

## 2022-06-10 LAB — BACTERIA SPEC AEROBE CULT: NO GROWTH

## 2023-06-07 ENCOUNTER — OFFICE VISIT (OUTPATIENT)
Dept: FAMILY MEDICINE CLINIC | Facility: CLINIC | Age: 28
End: 2023-06-07
Payer: COMMERCIAL

## 2023-06-07 VITALS
SYSTOLIC BLOOD PRESSURE: 110 MMHG | OXYGEN SATURATION: 99 % | HEIGHT: 68 IN | DIASTOLIC BLOOD PRESSURE: 58 MMHG | WEIGHT: 123.6 LBS | RESPIRATION RATE: 16 BRPM | TEMPERATURE: 97.4 F | BODY MASS INDEX: 18.73 KG/M2 | HEART RATE: 81 BPM

## 2023-06-07 DIAGNOSIS — Z30.41 ENCOUNTER FOR SURVEILLANCE OF CONTRACEPTIVE PILLS: Primary | ICD-10-CM

## 2023-06-07 PROBLEM — J30.2 SEASONAL ALLERGIC RHINITIS: Status: ACTIVE | Noted: 2023-06-07

## 2023-06-07 PROBLEM — R30.0 DIFFICULT OR PAINFUL URINATION: Status: RESOLVED | Noted: 2023-06-07 | Resolved: 2023-06-07

## 2023-06-07 PROBLEM — F39 MOOD DISORDER: Status: RESOLVED | Noted: 2023-06-07 | Resolved: 2023-06-07

## 2023-06-07 PROBLEM — N32.9 BLADDER DISORDER: Status: RESOLVED | Noted: 2023-06-07 | Resolved: 2023-06-07

## 2023-06-07 PROBLEM — N39.0 CHRONIC UTI: Status: RESOLVED | Noted: 2023-06-07 | Resolved: 2023-06-07

## 2023-06-07 PROCEDURE — 99213 OFFICE O/P EST LOW 20 MIN: CPT | Performed by: NURSE PRACTITIONER

## 2023-06-07 RX ORDER — MULTIPLE VITAMINS W/ MINERALS TAB 9MG-400MCG
1 TAB ORAL DAILY
COMMUNITY

## 2023-06-07 RX ORDER — ACETAMINOPHEN AND CODEINE PHOSPHATE 120; 12 MG/5ML; MG/5ML
1 SOLUTION ORAL DAILY
COMMUNITY
Start: 2023-02-26 | End: 2023-06-07

## 2023-06-07 RX ORDER — LEVONORGESTREL AND ETHINYL ESTRADIOL 0.1-0.02MG
1 KIT ORAL DAILY
Qty: 28 TABLET | Refills: 12 | Status: SHIPPED | OUTPATIENT
Start: 2023-06-07 | End: 2024-06-06

## 2023-06-07 NOTE — PROGRESS NOTES
Chief Complaint  Contraception (Options, pt is weaning her son from breast feeding and will need to stop the mini pill)    Subjective        Dixie Lawrence presents to [facility]  History of Present Illness  Is still weaning and would like to change birth control. Since plans on stopping in next 2 days and knows can't do combination with breast feeding.  Contraception  Pertinent negatives include no chest pain, coughing, fever, numbness or weakness.     The following portions of the patient's history were personally reviewed and updated as appropriate: allergies, current medications, past medical history, past surgical history, past family history, and past social history.     Body mass index is 19.06 kg/m².    BMI is within normal parameters. No other follow-up for BMI required.      Past History:    Medical History: has a past medical history of Anxiety (2017), Bladder disorder (06/07/2023), Diet controlled gestational diabetes mellitus (GDM) in third trimester, Difficult or painful urination (06/07/2023), History of medical problems (09/2021), and Mood disorder (06/07/2023).     Surgical History: has no past surgical history on file.     Family History: family history includes Birth defects in her father; Cancer in her father and maternal grandfather; Developmental Disability in her maternal aunt; Miscarriages / Stillbirths in her mother; Vision loss in her maternal grandmother.     Social History: reports that she has never smoked. She has never used smokeless tobacco. She reports that she does not drink alcohol and does not use drugs.    Allergies: Penicillins          Current Outpatient Medications:     levonorgestrel-ethinyl estradiol (AVIANE,ALESSE,LESSINA) 0.1-20 MG-MCG per tablet, Take 1 tablet by mouth Daily., Disp: 28 tablet, Rfl: 12    multivitamin with minerals tablet tablet, Take 1 tablet by mouth Daily., Disp: , Rfl:     Medications Discontinued During This Encounter  "  Medication Reason    prenatal vitamin (prenatal, CLASSIC, vitamin) tablet *Therapy completed    calcium carbonate (TUMS) 500 MG chewable tablet *Therapy completed    ibuprofen (ADVIL,MOTRIN) 600 MG tablet *Therapy completed    norethindrone (MICRONOR) 0.35 MG tablet *Therapy completed         Review of Systems   Constitutional:  Negative for fever.   Respiratory:  Negative for cough and shortness of breath.    Cardiovascular:  Negative for chest pain, palpitations and leg swelling.   Neurological:  Negative for dizziness, weakness, numbness and headache.      Objective         Vitals:    06/07/23 0654   BP: 110/58   BP Location: Right arm   Patient Position: Sitting   Cuff Size: Adult   Pulse: 81   Resp: 16   Temp: 97.4 °F (36.3 °C)   TempSrc: Temporal   SpO2: 99%   Weight: 56.1 kg (123 lb 9.6 oz)   Height: 171.5 cm (67.52\")     Body mass index is 19.06 kg/m².         Physical Exam  Vitals reviewed.   Constitutional:       Appearance: Normal appearance. She is well-developed.   HENT:      Head: Normocephalic and atraumatic.      Mouth/Throat:      Pharynx: No oropharyngeal exudate.   Eyes:      Conjunctiva/sclera: Conjunctivae normal.      Pupils: Pupils are equal, round, and reactive to light.   Cardiovascular:      Rate and Rhythm: Normal rate and regular rhythm.      Heart sounds: Normal heart sounds. No murmur heard.    No friction rub. No gallop.   Pulmonary:      Effort: Pulmonary effort is normal.      Breath sounds: Normal breath sounds. No wheezing or rhonchi.   Skin:     General: Skin is warm and dry.   Neurological:      Mental Status: She is alert and oriented to person, place, and time.   Psychiatric:         Mood and Affect: Mood and affect normal.         Behavior: Behavior normal.         Thought Content: Thought content normal.         Judgment: Judgment normal.           Result Review :               Assessment and Plan     Diagnoses and all orders for this visit:    1. Encounter for surveillance " of contraceptive pills (Primary)  -     levonorgestrel-ethinyl estradiol (AVIANE,ALESSE,LESSINA) 0.1-20 MG-MCG per tablet; Take 1 tablet by mouth Daily.  Dispense: 28 tablet; Refill: 12              Follow Up     Return for Next scheduled follow up.    Patient was given instructions and counseling regarding her condition or for health maintenance advice. Please see specific information pulled into the AVS if appropriate.

## 2023-11-17 ENCOUNTER — OFFICE VISIT (OUTPATIENT)
Dept: FAMILY MEDICINE CLINIC | Facility: CLINIC | Age: 28
End: 2023-11-17
Payer: COMMERCIAL

## 2023-11-17 VITALS
RESPIRATION RATE: 16 BRPM | DIASTOLIC BLOOD PRESSURE: 74 MMHG | OXYGEN SATURATION: 100 % | TEMPERATURE: 97.7 F | WEIGHT: 138.6 LBS | HEART RATE: 78 BPM | SYSTOLIC BLOOD PRESSURE: 106 MMHG | HEIGHT: 68 IN | BODY MASS INDEX: 21.01 KG/M2

## 2023-11-17 DIAGNOSIS — Z30.41 ENCOUNTER FOR SURVEILLANCE OF CONTRACEPTIVE PILLS: Primary | ICD-10-CM

## 2023-11-17 PROCEDURE — 99213 OFFICE O/P EST LOW 20 MIN: CPT | Performed by: NURSE PRACTITIONER

## 2023-11-17 RX ORDER — NORGESTIMATE AND ETHINYL ESTRADIOL 0.25-0.035
1 KIT ORAL DAILY
Qty: 84 TABLET | Refills: 12 | Status: SHIPPED | OUTPATIENT
Start: 2023-11-17

## 2023-11-17 RX ORDER — NORGESTIMATE AND ETHINYL ESTRADIOL 0.25-0.035
1 KIT ORAL DAILY
Qty: 28 TABLET | Refills: 12 | Status: SHIPPED | OUTPATIENT
Start: 2023-11-17 | End: 2023-11-17 | Stop reason: SDUPTHER

## 2023-11-17 NOTE — PROGRESS NOTES
Answers submitted by the patient for this visit:  Other (Submitted on 11/16/2023)  Please describe your symptoms.: Change birth control due to spotting longterm through each month for over a week, every month.  Have you had these symptoms before?: No  How long have you been having these symptoms?: Greater than 2 weeks  Primary Reason for Visit (Submitted on 11/16/2023)  What is the primary reason for your visit?: Other  Chief Complaint  Contraception (Would like a different type, has breakthrough bleeding every month)    [place]        Diixe Lawrence presents to [facility]  History of Present Illness  BTB with current BC.  Contraception  Pertinent negatives include no chest pain, coughing, fever, numbness or weakness.       The following portions of the patient's history were personally reviewed and updated as appropriate: allergies, current medications, past medical history, past surgical history, past family history, and past social history.     Body mass index is 21.37 kg/m².    BMI is within normal parameters. No other follow-up for BMI required.      Past History:    Medical History: has a past medical history of Anxiety (2017), Bladder disorder (06/07/2023), Diet controlled gestational diabetes mellitus (GDM) in third trimester, Difficult or painful urination (06/07/2023), History of medical problems (09/2021), and Mood disorder (06/07/2023).     Surgical History: has no past surgical history on file.     Family History: family history includes Birth defects in her father; Cancer in her father and maternal grandfather; Developmental Disability in her maternal aunt; Miscarriages / Stillbirths in her mother; Vision loss in her maternal grandmother.     Social History: reports that she has never smoked. She has been exposed to tobacco smoke. She has never used smokeless tobacco. She reports that she does not drink alcohol and does not use drugs.    Allergies:  "Penicillins          Current Outpatient Medications:     multivitamin with minerals tablet tablet, Take 1 tablet by mouth Daily., Disp: , Rfl:     norgestimate-ethinyl estradiol (ORTHO-CYCLEN) 0.25-35 MG-MCG per tablet, Take 1 tablet by mouth Daily., Disp: 84 tablet, Rfl: 12    Medications Discontinued During This Encounter   Medication Reason    levonorgestrel-ethinyl estradiol (AVIANE,ALESSE,LESSINA) 0.1-20 MG-MCG per tablet *Therapy completed    norgestimate-ethinyl estradiol (ORTHO-CYCLEN) 0.25-35 MG-MCG per tablet Reorder         Review of Systems   Constitutional:  Negative for fever.   Respiratory:  Negative for cough and shortness of breath.    Cardiovascular:  Negative for chest pain, palpitations and leg swelling.   Neurological:  Negative for dizziness, weakness, numbness and headache.        Objective         Vitals:    11/17/23 0735   BP: 106/74   BP Location: Right arm   Patient Position: Sitting   Cuff Size: Adult   Pulse: 78   Resp: 16   Temp: 97.7 °F (36.5 °C)   TempSrc: Temporal   SpO2: 100%   Weight: 62.9 kg (138 lb 9.6 oz)   Height: 171.5 cm (67.52\")     Body mass index is 21.37 kg/m².         Physical Exam  Vitals reviewed.   Constitutional:       Appearance: Normal appearance. She is well-developed.   HENT:      Head: Normocephalic and atraumatic.      Mouth/Throat:      Pharynx: No oropharyngeal exudate.   Eyes:      Conjunctiva/sclera: Conjunctivae normal.      Pupils: Pupils are equal, round, and reactive to light.   Cardiovascular:      Rate and Rhythm: Normal rate and regular rhythm.      Heart sounds: Normal heart sounds. No murmur heard.     No friction rub. No gallop.   Pulmonary:      Effort: Pulmonary effort is normal.      Breath sounds: Normal breath sounds. No wheezing or rhonchi.   Skin:     General: Skin is warm and dry.   Neurological:      Mental Status: She is alert and oriented to person, place, and time.   Psychiatric:         Mood and Affect: Mood and affect normal.         " Behavior: Behavior normal.         Thought Content: Thought content normal.         Judgment: Judgment normal.             Result Review :               Assessment and Plan     Diagnoses and all orders for this visit:    1. Encounter for surveillance of contraceptive pills (Primary)  -     Discontinue: norgestimate-ethinyl estradiol (ORTHO-CYCLEN) 0.25-35 MG-MCG per tablet; Take 1 tablet by mouth Daily.  Dispense: 28 tablet; Refill: 12  -     norgestimate-ethinyl estradiol (ORTHO-CYCLEN) 0.25-35 MG-MCG per tablet; Take 1 tablet by mouth Daily.  Dispense: 84 tablet; Refill: 12              Follow Up     Return for Next scheduled follow up.    Patient was given instructions and counseling regarding her condition or for health maintenance advice. Please see specific information pulled into the AVS if appropriate.

## 2024-01-11 ENCOUNTER — TELEMEDICINE (OUTPATIENT)
Dept: FAMILY MEDICINE CLINIC | Facility: TELEHEALTH | Age: 29
End: 2024-01-11
Payer: COMMERCIAL

## 2024-01-11 DIAGNOSIS — R39.89 SUSPECTED UTI: Primary | ICD-10-CM

## 2024-01-11 RX ORDER — NITROFURANTOIN 25; 75 MG/1; MG/1
100 CAPSULE ORAL 2 TIMES DAILY
Qty: 14 CAPSULE | Refills: 0 | Status: SHIPPED | OUTPATIENT
Start: 2024-01-11 | End: 2024-01-18

## 2024-01-11 RX ORDER — PHENAZOPYRIDINE HYDROCHLORIDE 200 MG/1
200 TABLET, FILM COATED ORAL 3 TIMES DAILY PRN
Qty: 6 TABLET | Refills: 0 | Status: SHIPPED | OUTPATIENT
Start: 2024-01-11 | End: 2024-01-13

## 2024-01-11 NOTE — PROGRESS NOTES
You have chosen to receive care through a telehealth visit.  Do you consent to use a video/audio connection for your medical care today? Yes     CHIEF COMPLAINT  No chief complaint on file.        HPI  Dixie Lawrence is a 28 y.o. female  presents with complaint of 1 day history of vaginal itching, dysuria, frequency, urgency.  She denies fever/chills, n/v, belly/back pain, hematuria at this time.  She has a history of UTIs.     At home UTI test was positive for nitrites and leukocytes    Review of Systems  See HPI    Past Medical History:   Diagnosis Date    Anxiety 2017    No longer bad or being treated    Bladder disorder 06/07/2023    Diet controlled gestational diabetes mellitus (GDM) in third trimester     Difficult or painful urination 06/07/2023    History of medical problems 09/2021    Miscarriage    Mood disorder 06/07/2023       Family History   Problem Relation Age of Onset    Miscarriages / Stillbirths Mother     Birth defects Father         Born with hole in heart    Cancer Father         Kidney and colon    Cancer Maternal Grandfather         Prostate    Vision loss Maternal Grandmother         Glaucoma    Developmental Disability Maternal Aunt        Social History     Socioeconomic History    Marital status:    Tobacco Use    Smoking status: Never     Passive exposure: Past    Smokeless tobacco: Never   Substance and Sexual Activity    Alcohol use: Never    Drug use: Never    Sexual activity: Yes     Partners: Male     Birth control/protection: Condom, Birth control pill       Dixie Lawrence  reports that she has never smoked. She has been exposed to tobacco smoke. She has never used smokeless tobacco..              There were no vitals taken for this visit.    PHYSICAL EXAM  Physical Exam   Constitutional: She is oriented to person, place, and time. She appears well-developed and well-nourished. She does not have a sickly appearance. She does not appear ill.   HENT:   Head: Normocephalic and  atraumatic.   Pulmonary/Chest: Effort normal.  No respiratory distress.  Neurological: She is alert and oriented to person, place, and time.           Diagnoses and all orders for this visit:    1. Suspected UTI (Primary)  -     nitrofurantoin, macrocrystal-monohydrate, (MACROBID) 100 MG capsule; Take 1 capsule by mouth 2 (Two) Times a Day for 7 days.  Dispense: 14 capsule; Refill: 0  -     phenazopyridine (PYRIDIUM) 200 MG tablet; Take 1 tablet by mouth 3 (Three) Times a Day As Needed for Bladder Spasms for up to 2 days.  Dispense: 6 tablet; Refill: 0    --take medications as prescribed  --increase fluids, rest as needed, tylenol or ibuprofen for pain  --f/u in 2-3 days if no improvement        FOLLOW-UP  As discussed during visit with PCP/[facility] if no improvement or Urgent Care/Emergency Department if worsening of symptoms    Patient verbalizes understanding of medication dosage, comfort measures, instructions for treatment and follow-up.    Bella Serrato, APRN  01/11/2024  09:42 EST    The use of a video visit has been reviewed with the patient and verbal informed consent has been obtained. Myself and Dixie Lawrence participated in this visit. The patient is located in [address] [place] [address] [address] [place] [id number].    I am located in [place], [place]. [employer] and [employer] were utilized. I spent 8 minutes in the patient's chart for this visit.

## 2024-07-31 ENCOUNTER — E-VISIT (OUTPATIENT)
Dept: FAMILY MEDICINE CLINIC | Facility: TELEHEALTH | Age: 29
End: 2024-07-31
Payer: COMMERCIAL

## 2024-07-31 NOTE — E-VISIT TREATED
Chief Complaint: Bladder infection (UTI)   Patient introduction   Patient is 29-year-old female. Patient provided the following organ inventory: Presence of vagina.   Patient has had dysuria, frequent urination, and urinary urgency for less than 24 hours.   Urine is yellow with no unusual odor.   General presentation   No fever. Patient has nausea.   No stomach/pelvic pain.   No back pain.   No flank pain. Difficulty starting, stopping, or delaying urination.   Vaginal symptoms include:    Abnormal vaginal itching   Has not tried acetaminophen, ibuprofen, or phenazopyridine for current symptoms.   Has not taken antibiotics for current symptoms.   Previous history of UTI. Current symptoms feel exactly the same as previous UTIs. Received treatment for UTI 1 to 3 times in last year. Patient's last use of antibiotics for UTI was more than 3 months ago.   No known history of yeast infections as a result of taking antibiotics for past UTIs.   No history of pyelonephritis. No history of kidney stones.   Had sexual intercourse in the past week. Does not use diaphragm. No unprotected sexual intercourse with a new partner in the last 2 weeks.   Patient is not being treated for diabetes mellitus.   Review of red flags/alarm symptoms:    No recent hospitalizations or nursing home care (last 3 months)    No history of renal failure    No recent history of urologic instrumentation    No anatomic abnormalities of the urinary tract    No abnormal vaginal discharge    No visible vaginal sores    No pain with sexual intercourse    No abnormal vaginal bleeding or spotting   Pregnancy/menstrual status/breastfeeding:   Patient is not pregnant. Patient is not breastfeeding. Regarding date of last menstrual period, patient writes: Started today.   Current medications   Currently taking norgestimate-ethinyl estradiol 0.25-35 MG-MCG per tablet and multivitamin with minerals tablet tablet.   Medication allergies    Penicillins    Medication contraindication review   Not taking ACE inhibitors and ARBs.   No history of Al-Danlos syndrome, folate deficiency, G6PD deficiency, high blood pressure, aortic aneurysm or dissection, Marfan syndrome, megaloblastic anemia, mononucleosis, myasthenia gravis, oliguria/anuria, or peripheral vascular disease.   No known history of amoxicillin-clavulanate-associated cholestatic jaundice or nitrofurantoin-associated cholestatic jaundice.   Past medical history   Immune conditions: No immunocompromising conditions.   No history of cancer.   Patient-submitted comments   Patient was asked if they had anything to add about their symptoms. Patient writes: Took at-home test. Was Leukocyte positive and Nitrite negative. This is the same result I get every time I have had a UTI in the past and take a test. .   Patient did not request an excuse note.   Assessment   Uncomplicated acute UTI.   This is the likely diagnosis based on patient's interview responses, including:    Symptom profile    Previous history of UTI    Current symptoms are exactly the same as previous UTIs   No recent history of kidney stones.   Plan   Medications:    nitrofurantoin monohydrate/macrocrystals 100 mg capsule RX 100mg 1 cap PO q12h 5d for infection. This medication is an antibiotic. Take it exactly as directed. You must finish the entire course of medication, even if you feel better after taking the first few doses. Amount is 10 cap.    phenazopyridine 200 mg tablet RX 200mg 1 tab PO tid PRN 2d for pain or discomfort associated with your condition. Amount is 6 tab.    fluconazole 150 mg tablet RX 150mg 1 tab PO once 1d as a single dose for vaginal yeast infection. Repeat in 72 hours if symptoms persist. Amount is 2 tab.   The patient's prescriptions will be sent to:   [facility]   [address] [place], [place] [address] [id number]   Phone: [phone]     Fax: [phone]   Education:    Condition and causes    Prevention     Treatment and self-care    When to call provider   Follow-up:   Patient to follow up as needed for progression or lack of improvement in symptoms within 3d.   ----------   Electronically signed by ROOSEVELT Mukherjee on 2024-07-31 at 08:00AM   ----------   Patient Interview Transcript:   Knowing about your anatomy is important for diagnosing and treating UTIs. The gender we have on file for you is female, but we realize this might not tell the whole story. Which of these do you have? Select one.    Vagina   Not selected:    Penis   Which of these symptoms do you have? Select all that apply.    Pain or burning while urinating    Frequent urination    __Sudden urge to urinate and it's hard to hold the urine in __   How long have you had these symptoms? Select one.    Less than 24 hours   Not selected:    1 to 3 days    4 to 6 days    7 to 10 days    More than 10 days   Have you already started taking antibiotics for your current symptoms? Select one.    No   Not selected:    Yes   Since your current symptoms started, has it been difficult to start, stop, or delay urination? Select one.    Yes   Not selected:    No   What color is your urine? Select one.    Yellow   Not selected:    Clear    Cloudy    Pink or red   Does your urine smell strange (like ammonia) or stronger than usual? Select one.    No   Not selected:    Yes   Do you also have any of these symptoms? Select all that apply.    Nausea   Not selected:    Fever    Vomiting    Pain, pressure, or discomfort in the lower abdomen    Back pain    No   Do you have any flank pain? The flank is the side of the body between the ribs and the hips.    No   Not selected:    Yes, in my left flank    Yes, in my right flank    Yes, in both my left and right flanks   Do you have any of these vaginal symptoms? Select all that apply.    Abnormal vaginal itching   Not selected:    Unscheduled or abnormal vaginal bleeding or spotting    Pain during sex    Visible sores on the  vagina    Abnormal vaginal discharge    No   In the past 2 weeks, have you had a medical device or instrument placed in your urinary tract? Examples include catheters, stents, and nephrostomy tubes. Select one.    No   Not selected:    Yes   Have you recently been hospitalized or been a resident of a nursing home or other long-term care facility? This doesn't include emergency room (ER) visits. Select one.    No   Not selected:    Yes, within the last 2 weeks    Yes, within the last 3 months   Kidney failure    No   Not selected:    Within the last year    More than a year ago   Kidney infection (pyelonephritis)    No   Not selected:    Within the last year    More than a year ago   Kidney stones    No   Not selected:    Within the last year    More than a year ago   Kidney transplant    No   Not selected:    Within the last year    More than a year ago   Have you ever been diagnosed with any of these? Select all that apply.    No   Not selected:    Urinary reflux    Bladder diverticula    Single (or horseshoe) kidney    Duplicated urethra   Have you recently held your urine for a long time after you felt the urge to go? Select one.    No   Not selected:    Yes   Have you recently avoided eating or drinking so you wouldn't have the urge to urinate as often? Select one.    No   Not selected:    Yes   Do you use a diaphragm? Select one.    No   Not selected:    Yes   Are you pregnant? Select one.    No   Not selected:    Yes   When was your last menstrual period? If you don't currently have periods or no longer have periods, please briefly explain.    Started today   Are you breastfeeding? Select one.    No   Not selected:    Yes   Have you had sexual intercourse in the past week? Recent sexual intercourse is a risk factor for urinary tract infections. Select one.    Yes   Not selected:    No   Have you had unprotected sexual intercourse with a new partner in the last 2 weeks? Select one.    No   Not selected:     Yes   Have you traveled to any of these countries within the last 3 months? Recent travel to these countries may affect which medication we recommend for your symptoms. Select all that apply.    None of these   Not selected:    [place]    [place]    [place]    [place]   Have you ever had a urinary tract infection (UTI)? A UTI is often called a bladder infection or acute cystitis. Select one.    Yes   Not selected:    No, not that I know of   How much do your current symptoms feel like past UTIs? Select one.    Exactly the same   Not selected:    Mostly the same    Somewhat the same    Totally different   In the past year, how many times have you taken antibiotics for a UTI? Select one.    1 to 3   Not selected:    0    4 or more   When did you last take antibiotics for a UTI? Select one.    More than 3 months ago   Not selected:    Within the last 3 months   Have you ever developed a yeast infection as a result of taking antibiotics? Select one.    No, not that I know of   Not selected:    Yes   UTIs may be more serious when other factors are present. Let's address those now. Are you being treated for type 1 or type 2 diabetes? Select one.    No   Not selected:    Yes   Do you have any of these conditions that can affect the immune system? Scroll to see all options. Select all that apply.    None of these   Not selected:    History of bone marrow transplant    Chronic kidney disease    Chronic liver disease (including cirrhosis)    HIV/AIDS    Inflammatory bowel disease (Crohn's disease or ulcerative colitis)    Lupus    Moderate to severe plaque psoriasis    Multiple sclerosis    Rheumatoid arthritis    Sickle cell anemia    Alpha or beta thalassemia    History of kidney, liver, heart, or other solid organ transplant    History of liver, heart, or other solid organ transplant    History of spleen removal    An autoimmune disorder not listed here (specify)    A condition requiring treatment with long-term use of oral  "steroids (such as prednisone, prednisolone, or dexamethasone) (specify)   Have you ever been diagnosed with cancer? Select one.    No   Not selected:    Yes, I have cancer now    Yes, but I'm in remission   These last few questions will help us create the right treatment plan for you. Are you being treated for any of these conditions? Select all that apply.    No   Not selected:    Al-Danlos syndrome    Folate deficiency    G6PD deficiency    High blood pressure    History of aortic aneurysm or dissection    Marfan syndrome    Megaloblastic anemia    Mono (mononucleosis)    Myasthenia gravis    Oliguria or anuria    Peripheral vascular disease   Have you ever had jaundice as a result of taking amoxicillin-clavulanate (Augmentin) or nitrofurantoin (Macrobid)? Select all that apply.    No   Not selected:    Yes, from amoxicillin-clavulanate (Augmentin)    Yes, from nitrofurantoin (Macrobid, Macrodantin)   Are you taking any of these medications? Select all that apply.    No   Not selected:    An ACE inhibitor such as lisinopril, enalapril, captopril, or benazepril    An angiotensin II receptor blocker (ARB) such as candesartan, irbesartan, losartan, or valsartan   Are you still taking these medications listed in your medical record? If you're not taking any of these, click Next. Select all that apply.    norgestimate-ethinyl estradiol 0.25-35 MG-MCG per tablet    multivitamin with minerals tablet tablet   Are you taking any other medications, vitamins, or supplements? Select one.    No   Not selected:    Yes   Have you ever had an allergic or bad reaction to any medication? Select one.    Yes   Not selected:    No   Have you had an allergic or bad reaction to any of these medications? Select all that apply.    Any antibiotic ending with \"-cillin,\" such as penicillin, amoxicillin, ampicillin, dicloxacillin, nafcillin, or piperacillin (brands include Augmentin, Unasyn, and Zosyn)   Not selected:    Any antibiotic " "starting with \"cef-,\" such as cefazolin, cefdinir, cefuroxime, ceftriaxone, ceftazidime, cefepime, or cephalexin (brands include Fortaz and Keflex)    Any antibiotic ending with \"-floxacin,\" such as ciprofloxacin, gemifloxacin, levofloxacin, moxifloxacin, or ofloxacin (brands include Cipro, Factive, Floxin, and Levaquin)    Nitrofurantoin (brands include Furadantin, Macrobid and Macrodantin)    Sulfamethoxazole-trimethoprim (brands include Bactrim and Septra) or any other sulfa drug    Fluconazole (brand name Diflucan), itraconazole, or terconazole    Trimethoprim (brand name Primsol)    No, not that I know of   When you had an allergic or bad reaction to penicillin or another \"-cillin\" antibiotic, did you have any of these symptoms? Select all that apply.    None of the above   Not selected:    Raised, red, itchy spots with each spot lasting less than 24 hours    Swelling of the mouth, eyes, lips, or tongue    Blisters or ulcers on the lips, mouth, eyes, or vagina    _Peeling skin _    Breathing difficulties    Feeling light-headed or faint    A fast heartbeat    Clammy skin    Confusion and anxiety    Collapsing or losing consciousness    Joint pains    Problems with kidneys, lungs, or liver   Have you had an allergic or bad reaction to any of these medications? Select all that apply.    No   Not selected:    Acetaminophen (Tylenol)    Ibuprofen (Advil, Midol, Motrin)    Phenazopyridine (Azo, Baridium, Pyridium, Uricalm, Uristat)   Do you need a doctor's note? A doctor's note confirms that you received care today and states when you can return to school or work. It does not contain information about your diagnosis or treatment plan. Your provider will make the final decision on whether to give you a doctor's note. Doctor's notes CANNOT be backdated. Select one.    No   Not selected:    Today only (1 day)    Today and tomorrow (2 days)    3 days   Is there anything you'd like to add about your symptoms? Please " limit your comments to the symptoms covered in this interview. If you include comments about other concerns, your provider may recommend that you be seen in person.    __Took at-home test. Was Leukocyte positive and Nitrite negative. This is the same result I get every time I have had a UTI in the past and take a test. __   ----------   Medical history   Medical history data does not currently exist for this patient.

## 2024-07-31 NOTE — EXTERNAL PATIENT INSTRUCTIONS
Note   If you do not have resolution of symptoms, go to PCP or Urgent Care for a Urine culture. Do not take the fluconazole until after you have finished the antibiotic.   Diagnosis   Urinary tract infection (UTI)   My name is ROOSEVELT Mukherjee. I'm a healthcare provider at [facility]. After reviewing your interview, I see you have a urinary tract infection (UTI).   Medications   Your pharmacy   [facility] [address] [place], [place] [address] [address] [phone]     Prescription   Nitrofurantoin monohydrate/macrocrystalline (100mg): Take 1 capsule by mouth every 12 hours for 5 days for infection. This medication is an antibiotic. Take it exactly as directed. You must finish the entire course of medication, even if you feel better after taking the first few doses.   Phenazopyridine (200mg): Take 1 tablet by mouth 3 times a day as needed for 2 days for pain or discomfort associated with your condition.   Fluconazole (150mg): Take 1 tablet by mouth once for 1 day as a single dose. Use this medication only if you develop a yeast infection. If yeast infection symptoms are still present 3 days after taking the first tablet, take the second tablet.    I've given you a prescription dose of phenazopyridine. If it's more affordable or convenient, you may use the equivalent amount of non-prescription phenazopyridine. For example, instead of taking one 200 mg phenazopyridine tablet, you may take two 95 mg phenazopyridine tablets.   About your diagnosis   A UTI is an infection of one or more parts of the urinary tract, most commonly the bladder.   Most UTIs are caused by bacteria (usually E. coli) that travel up the urethra and into the bladder. I see that you have some common signs and symptoms of a UTI:    Pain or burning while urinating    Frequent urination    Sudden urge to urinate    Symptoms that feel a lot like past UTIs    Symptoms that began shortly after sexual intercourse   Fortunately, most UTIs  aren't serious, and they're easily treated with antibiotics. Make sure you take all of the antibiotic pills given to you, even if you start to feel better after the first few doses. Otherwise, the UTI might come back.   What to expect   If you follow this treatment plan, you should start to feel better within 1 to 2 days.   When to seek care   Call us at [phone]   with any sudden or unexpected symptoms.    Symptoms that don't improve or get worse in the next 48 hours    Fever that goes above 101F or lasts longer than 24 hours    Shaking or chills    Nausea or vomiting    Severe flank pain (pain in your back or side)   Other treatment    Rest and drink plenty of water    Urinate frequently and when you first feel the urge    Place a heating pad on your back or stomach to help relieve some of the discomfort   Prevention    Drink a lot of liquids to help flush bacteria from your system. Water is best. Try for six to eight, 8-ounce glasses a day on a regular basis.    Urinate often and when you first feel the urge. Bacteria can grow when urine stays in the bladder too long. Urinate after sex to flush away bacteria.    After using the toilet, always wipe from front to back. This step is most important after a bowel movement. Wiping from front to back prevents bacteria normally found in stool from entering the urinary tract.   Your provider   Your diagnosis was provided by ROOSEVELT Mukherjee, a member of your trusted care team at [facility].   If you have any questions, call us at [phone]  .

## 2024-12-23 ENCOUNTER — OFFICE VISIT (OUTPATIENT)
Dept: FAMILY MEDICINE CLINIC | Facility: CLINIC | Age: 29
End: 2024-12-23
Payer: COMMERCIAL

## 2024-12-23 VITALS
BODY MASS INDEX: 20.7 KG/M2 | HEART RATE: 79 BPM | TEMPERATURE: 97.9 F | DIASTOLIC BLOOD PRESSURE: 70 MMHG | WEIGHT: 136.6 LBS | HEIGHT: 68 IN | OXYGEN SATURATION: 99 % | SYSTOLIC BLOOD PRESSURE: 118 MMHG

## 2024-12-23 DIAGNOSIS — Z30.41 ENCOUNTER FOR SURVEILLANCE OF CONTRACEPTIVE PILLS: ICD-10-CM

## 2024-12-23 DIAGNOSIS — Z12.4 SCREENING FOR CERVICAL CANCER: ICD-10-CM

## 2024-12-23 DIAGNOSIS — Z00.00 ANNUAL PHYSICAL EXAM: Primary | ICD-10-CM

## 2024-12-23 PROCEDURE — 87624 HPV HI-RISK TYP POOLED RSLT: CPT | Performed by: NURSE PRACTITIONER

## 2024-12-23 PROCEDURE — G0123 SCREEN CERV/VAG THIN LAYER: HCPCS | Performed by: NURSE PRACTITIONER

## 2024-12-23 PROCEDURE — 99395 PREV VISIT EST AGE 18-39: CPT | Performed by: NURSE PRACTITIONER

## 2024-12-23 RX ORDER — NORGESTIMATE AND ETHINYL ESTRADIOL 0.25-0.035
1 KIT ORAL DAILY
Qty: 84 TABLET | Refills: 3 | Status: SHIPPED | OUTPATIENT
Start: 2024-12-23

## 2024-12-23 NOTE — PROGRESS NOTES
Answers submitted by the patient for this visit:  Primary Reason for Visit (Submitted on 12/16/2024)  What is the primary reason for your visit?: Problem Not Listed  Problem not listed (Submitted on 12/16/2024)  Chief Complaint: Other medical problem  Reason for appointment: Physical  abdominal pain: No  anorexia: No  joint pain: No  change in stool: No  chest pain: No  chills: No  nasal congestion: No  cough: No  diaphoresis: No  fatigue: No  fever: No  headaches: No  joint swelling: No  myalgias: No  nausea: No  neck pain: No  numbness: No  rash: No  sore throat: No  swollen glands: No  dysuria: No  vertigo: No  visual change: No  vomiting: No  weakness: No  Chief Complaint  Annual Exam    Subjective        Dixie AMI Lawrence presents to [facility]  History of Present Illness  Annual exam:  Denies SBE, ACHES, or BTB no change in menses and timing.    Physical  Pertinent negative symptoms include no abdominal pain, no anorexia, no joint pain, no change in stool, no chest pain, no chills, no congestion, no cough, no diaphoresis, no fatigue, no fever, no headaches, no joint swelling, no myalgias, no nausea, no neck pain, no numbness, no rash, no sore throat, no swollen glands, no dysuria, no vertigo, no visual change, no vomiting and no weakness.       The following portions of the patient's history were personally reviewed and updated as appropriate: allergies, current medications, past medical history, past surgical history, past family history, and past social history.     Body mass index is 21.07 kg/m².    BMI is within normal parameters. No other follow-up for BMI required.      Past History:    Medical History: has a past medical history of Anxiety (2017), Bladder disorder (06/07/2023), Diet controlled gestational diabetes mellitus (GDM) in third trimester, Difficult or painful urination (06/07/2023), History of medical problems (09/2021), and Mood disorder (06/07/2023).  "    Surgical History: has no past surgical history on file.     Family History: family history includes Birth defects in her father; Cancer in her father and maternal grandfather; Developmental Disability in her maternal aunt; Miscarriages / Stillbirths in her mother; Vision loss in her maternal grandmother.     Social History: reports that she has never smoked. She has been exposed to tobacco smoke. She has never used smokeless tobacco. She reports that she does not drink alcohol and does not use drugs.    Allergies: Penicillins          Current Outpatient Medications:     norgestimate-ethinyl estradiol (ORTHO-CYCLEN) 0.25-35 MG-MCG per tablet, Take 1 tablet by mouth Daily., Disp: 84 tablet, Rfl: 3    multivitamin with minerals tablet tablet, Take 1 tablet by mouth Daily., Disp: , Rfl:     Medications Discontinued During This Encounter   Medication Reason    norgestimate-ethinyl estradiol (ORTHO-CYCLEN) 0.25-35 MG-MCG per tablet Reorder         Review of Systems   Constitutional:  Negative for chills, diaphoresis, fatigue and fever.   HENT:  Negative for congestion, sore throat and swollen glands.    Respiratory:  Negative for cough and shortness of breath.    Cardiovascular:  Negative for chest pain, palpitations and leg swelling.   Gastrointestinal:  Negative for abdominal pain, anorexia, nausea and vomiting.   Genitourinary:  Negative for dysuria.   Musculoskeletal:  Negative for joint pain, myalgias and neck pain.   Skin:  Negative for rash.   Neurological:  Negative for dizziness, vertigo, weakness, numbness and headache.        Objective         Vitals:    12/23/24 0652   BP: 118/70   BP Location: Left arm   Patient Position: Sitting   Cuff Size: Adult   Pulse: 79   Temp: 97.9 °F (36.6 °C)   SpO2: 99%   Weight: 62 kg (136 lb 9.6 oz)   Height: 171.5 cm (67.52\")     Body mass index is 21.07 kg/m².         Physical Exam  Vitals reviewed.   Constitutional:       Appearance: Normal appearance. She is " well-developed.   HENT:      Head: Normocephalic and atraumatic.      Mouth/Throat:      Pharynx: No oropharyngeal exudate.   Eyes:      Conjunctiva/sclera: Conjunctivae normal.      Pupils: Pupils are equal, round, and reactive to light.   Neck:      Thyroid: No thyroid mass, thyromegaly or thyroid tenderness.   Cardiovascular:      Rate and Rhythm: Normal rate and regular rhythm.      Heart sounds: Normal heart sounds. No murmur heard.     No friction rub. No gallop.   Pulmonary:      Effort: Pulmonary effort is normal.      Breath sounds: Normal breath sounds. No wheezing or rhonchi.   Abdominal:      General: Bowel sounds are normal. There is no distension.      Palpations: Abdomen is soft.      Tenderness: There is no abdominal tenderness. There is no guarding.      Hernia: There is no hernia in the left inguinal area or right inguinal area.   Genitourinary:     Pubic Area: No rash.       Labia:         Right: No rash or injury.         Left: No rash or injury.       Urethra: No prolapse.      Vagina: Normal.      Cervix: Normal.      Uterus: Normal.       Adnexa: Right adnexa normal and left adnexa normal.      Rectum: Normal.   Musculoskeletal:         General: Normal range of motion.      Cervical back: Normal range of motion.   Skin:     General: Skin is warm and dry.      Capillary Refill: Capillary refill takes less than 2 seconds.   Neurological:      Mental Status: She is alert and oriented to person, place, and time.      Cranial Nerves: No cranial nerve deficit.   Psychiatric:         Mood and Affect: Mood and affect normal.         Behavior: Behavior normal.         Thought Content: Thought content normal.         Judgment: Judgment normal.             Result Review :               Assessment and Plan     Diagnoses and all orders for this visit:    1. Annual physical exam (Primary)  Comments:  discussed diet and exercise.  Orders:  -     Comprehensive Metabolic Panel; Future  -     Lipid Panel With /  Chol / HDL Ratio; Future  -     Urinalysis With Culture If Indicated -; Future  -     CBC & Differential; Future  -     TSH Rfx On Abnormal To Free T4; Future    2. Encounter for surveillance of contraceptive pills  -     norgestimate-ethinyl estradiol (ORTHO-CYCLEN) 0.25-35 MG-MCG per tablet; Take 1 tablet by mouth Daily.  Dispense: 84 tablet; Refill: 3              Follow Up     Return in about 1 year (around 12/23/2025).    Patient was given instructions and counseling regarding her condition or for health maintenance advice. Please see specific information pulled into the AVS if appropriate.

## 2024-12-23 NOTE — PROGRESS NOTES
Chief Complaint  Annual Exam    Subjective        Dixie Lawrence presents to [facility]  History of Present Illness    The following portions of the patient's history were personally reviewed and updated as appropriate: allergies, current medications, past medical history, past surgical history, past family history, and past social history.     Body mass index is 21.07 kg/m².    BMI is within normal parameters. No other follow-up for BMI required.      Past History:    Medical History: has a past medical history of Anxiety (2017), Bladder disorder (06/07/2023), Diet controlled gestational diabetes mellitus (GDM) in third trimester, Difficult or painful urination (06/07/2023), History of medical problems (09/2021), and Mood disorder (06/07/2023).     Surgical History: has no past surgical history on file.     Family History: family history includes Birth defects in her father; Cancer in her father and maternal grandfather; Developmental Disability in her maternal aunt; Miscarriages / Stillbirths in her mother; Vision loss in her maternal grandmother.     Social History: reports that she has never smoked. She has been exposed to tobacco smoke. She has never used smokeless tobacco. She reports that she does not drink alcohol and does not use drugs.    Allergies: Penicillins          Current Outpatient Medications:     multivitamin with minerals tablet tablet, Take 1 tablet by mouth Daily., Disp: , Rfl:     norgestimate-ethinyl estradiol (ORTHO-CYCLEN) 0.25-35 MG-MCG per tablet, Take 1 tablet by mouth Daily., Disp: 84 tablet, Rfl: 12    There are no discontinued medications.      Review of Systems   Constitutional:  Negative for chills, diaphoresis, fatigue and fever.   HENT:  Negative for congestion, sore throat and swollen glands.    Respiratory:  Negative for cough.    Cardiovascular:  Negative for chest pain.   Gastrointestinal:  Negative for abdominal pain, nausea and vomiting.  "  Genitourinary:  Negative for dysuria.   Musculoskeletal:  Negative for myalgias and neck pain.   Skin:  Negative for rash.   Neurological:  Negative for weakness and numbness.        Objective         Vitals:    12/23/24 0652   BP: 118/70   BP Location: Left arm   Patient Position: Sitting   Cuff Size: Adult   Pulse: 79   Temp: 97.9 °F (36.6 °C)   SpO2: 99%   Weight: 62 kg (136 lb 9.6 oz)   Height: 171.5 cm (67.52\")     Body mass index is 21.07 kg/m².         Physical Exam  Vitals reviewed.   Constitutional:       Appearance: Normal appearance. She is well-developed.   HENT:      Head: Normocephalic and atraumatic.      Mouth/Throat:      Pharynx: No oropharyngeal exudate.   Eyes:      Conjunctiva/sclera: Conjunctivae normal.      Pupils: Pupils are equal, round, and reactive to light.   Neck:      Thyroid: No thyroid mass, thyromegaly or thyroid tenderness.   Cardiovascular:      Rate and Rhythm: Normal rate and regular rhythm.      Heart sounds: No murmur heard.     No friction rub. No gallop.   Pulmonary:      Effort: Pulmonary effort is normal.      Breath sounds: Normal breath sounds. No wheezing or rhonchi.   Abdominal:      General: Bowel sounds are normal. There is no distension.      Palpations: Abdomen is soft.      Tenderness: There is no abdominal tenderness. There is no guarding.      Hernia: There is no hernia in the left inguinal area or right inguinal area.   Genitourinary:     Pubic Area: No rash.       Labia:         Right: No rash or injury.         Left: No rash or injury.       Urethra: No prolapse.      Vagina: Normal.      Cervix: Normal.      Uterus: Normal.       Adnexa: Right adnexa normal and left adnexa normal.      Rectum: Normal.   Musculoskeletal:         General: Normal range of motion.      Cervical back: Normal range of motion.   Skin:     General: Skin is warm and dry.      Capillary Refill: Capillary refill takes less than 2 seconds.   Neurological:      Mental Status: She is " alert and oriented to person, place, and time.      Cranial Nerves: No cranial nerve deficit.   Psychiatric:         Mood and Affect: Mood and affect normal.         Behavior: Behavior normal.         Thought Content: Thought content normal.         Judgment: Judgment normal.             Result Review :               Assessment and Plan     There are no diagnoses linked to this encounter.          Follow Up     No follow-ups on file.    Patient was given instructions and counseling regarding her condition or for health maintenance advice. Please see specific information pulled into the AVS if appropriate.

## 2024-12-30 LAB
CYTOLOGIST CVX/VAG CYTO: NORMAL
CYTOLOGY CVX/VAG DOC CYTO: NORMAL
CYTOLOGY CVX/VAG DOC THIN PREP: NORMAL
DX ICD CODE: NORMAL
HPV I/H RISK 4 DNA CVX QL PROBE+SIG AMP: NEGATIVE
Lab: NORMAL
OTHER STN SPEC: NORMAL
STAT OF ADQ CVX/VAG CYTO-IMP: NORMAL

## 2025-06-03 ENCOUNTER — E-VISIT (OUTPATIENT)
Dept: FAMILY MEDICINE CLINIC | Facility: TELEHEALTH | Age: 30
End: 2025-06-03
Payer: COMMERCIAL

## 2025-06-03 NOTE — E-VISIT TREATED
Date: 2025 06:34:44  Clinician: Bella Serrato  Clinician NPI: [id number]  Patient: Dixie Lawrence  Patient : 1995  Patient Address: [address] [place] [address] [place] [address]  Patient Phone: [phone]  Visit Protocol: UTI  Patient Summary:  Dixie is a 30 year old ( : 1995 ) female who initiated a visit for a presumed bladder infection.    The symptoms started today and consist of dysuria, foul-smelling urine, urgency, urinary incontinence, feeling as if the   bladder is never empty, and urinary frequency.   Symptom details   Urine color: Yellow    Denied symptoms include vaginal itching, abdominal pain, vaginal discharge, vomiting, nausea, and chills. Dixie denies flank pain. Dixie does not feel feverish.     Dixie has used over-the-counter medications or home remedies to relieve the current symptoms.  Precipitating events  Dixie denies having a sexually transmitted infection.  Pertinent medical history  Dixie does not get a yeast infection when taking   antibiotics.   Dixie does not get yeast infections when taking antibiotics. Dixie has not experienced problems or side effects with any of the common antibiotics used to treat bladder infections.   Dixie does not have a history of bladder infections.   Dixie has not had a procedure or surgery done to the urinary tract. Dixie has not been diagnosed with advanced kidney disease.   Dixie has not used a catheter and denies being a patient in a hospital or nursing home in the past 2 weeks. Dixie does   not have diabetes. Immunosuppressive conditions (e.g., chemotherapy, HIV, organ transplant, long-term use of steroids or other immunosuppressive medications, splenectomy) were denied.   Dixie does not smoke or use smokeless tobacco. Dixie does not vape   or use other e-cigarette products.   Dixie denies pregnancy and denies breastfeeding.   Additional information as reported by the patient (free text): I get UTIs often and I'm  experiencing the same symptoms I usually do. Took an at-home test and it   showed positive.     MEDICATIONS: No current medications, ALLERGIES: Penicillins  Clinician Response:  Dear Dixie,  Based on the information you have provided, you have an acute urinary tract infection, also called a bladder infection. Bladder infections occur when bacteria from the outside of the body enters the urinary tract. Any   part of the urinary system can be infected, but the bladder is the most common.  Medication information  I am prescribing:       Nitrofurantoin monohyd/m-cryst (Macrobid) 100 mg oral capsule. Take 1 capsule by mouth every 12 hours for 5 days. Take this medication with food. There are no refills with this prescription.      Phenazopyridine (Pyridium) 200 mg oral tablet. Take 1 tablet by mouth 3 times per day for up to 2 days as needed. Take the tablets with a full glass of water after a meal. There are no refills with this prescription. This medication may also be purchased   over-the-counter.     The medication I prescribed for your bladder infection is an antibiotic. Continue taking the medication until it is gone even if you feel better.   Yeast infections can be a common side effect of antibiotics. The most common symptom of a yeast   infection is itchiness in and around the vagina. Other signs and symptoms include burning, redness of the vulva (the outer part of the female genitals), or a thick, white vaginal discharge that looks like cottage cheese and does not have a bad smell.    Self care  Urination helps to flush bacteria from the urinary tract. For this reason, drinking water and urinating often helps relieve some urinary symptoms and can decrease your risk of getting bladder infections in the future.  Other steps you can take   to prevent future bladder infections include:     Wipe front to back after using the bathroom    Urinate after sexual intercourse    Avoid using deodorant sprays, douches, or  powders in the vaginal area     When to seek care  Please make an appointment to be seen in a clinic or urgent care if any of the following occur:     You develop new symptoms or your symptoms become worse    You have medication side effects that make it difficult to take them as prescribed    Your symptoms do not improve within 1-2 days of starting treatment    You have symptoms of a bladder infection that return shortly after completing treatment     It is possible to have an allergic reaction to an antibiotic even if you have not had one in the past. If you notice a new rash, significant swelling, or difficulty breathing, stop taking this medication immediately and go to a clinic or urgent care.   Diagnosis: Acute uncomplicated bladder infection  Diagnosis ICD: N39.0    Follow up instructions:  ATTENTION: If you have been prescribed medications, your prescriptions will not be sent until you choose your pharmacy. To do so open the link within your notification, or go to [facility] and click eVisit in the menu to open your   treatment plan. From there, you can select your pharmacy at the bottom of your after visit summary. You can also go to [email or web address]   Prescriptions  Prescription: nitrofurantoin monohyd/m-cryst (Macrobid) 100 mg oral capsule, take 1 capsule by mouth every 12 hours for 5 days  Sent To: [facility] - [id number] - [address] [place] [address]  Prescription: phenazopyridine (Pyridium) 200 mg oral tablet, take 1 tablet by mouth 3 times per day for up to 2 days as needed  Sent To: [facility] - [id number] - [address] [place] [address]

## 2025-06-10 ENCOUNTER — TELEPHONE (OUTPATIENT)
Dept: FAMILY MEDICINE CLINIC | Facility: CLINIC | Age: 30
End: 2025-06-10
Payer: COMMERCIAL

## 2025-06-10 DIAGNOSIS — Z01.89 PATIENT REQUEST FOR DIAGNOSTIC TESTING: Primary | ICD-10-CM

## 2025-06-10 NOTE — TELEPHONE ENCOUNTER
"Caller: Dixie Lawrence \"Alii\"    Relationship: Self    Best call back number: [phone]     What orders are you requesting (i.e. lab or imaging): LABS TO HAVE HORMONE LEVELS CHECKED     In what timeframe would the patient need to come in: AS SOON AS POSSIBLE     Where will you receive your lab/imaging services: [facility]     Additional notes: CALLER SAD THAT HER THERAPIST SUGGESTED THAT PRIMARY CARE OR LABS TO HAVE HORMONE LEVELS CHECKED   "

## 2025-06-11 NOTE — TELEPHONE ENCOUNTER
I placed the orders for an FSH and LH which helps us make estrogen progesterone.  As well as a TSH and rate blocks.  However, if she is on birth control I do not expect her FSH LH to be off normal hormone show if she is having any kind of changes.  I placed hormone orders just as she requested.

## 2025-06-12 ENCOUNTER — TELEPHONE (OUTPATIENT)
Dept: FAMILY MEDICINE CLINIC | Facility: CLINIC | Age: 30
End: 2025-06-12

## 2025-06-12 NOTE — TELEPHONE ENCOUNTER
Spoke to pt, she states she has not completed labs yet as she just stopped taking her birth control about a week ago and wanted to know if it was okay to go ahead with that lab. Spoke with Zia, advised per Zia she would need to wait at least a month before completing her hormone labs however it would be fine to complete the TSH. Pt voiced understanding.

## 2025-06-12 NOTE — TELEPHONE ENCOUNTER
"Caller: Dixie Lwarence \"Nereyda\"    Relationship to patient: Self    Best call back number: [phone]    Patient is needing: PATIENT IS REQUESTING A CALL BACK TO DISCUSS ABOUT HER BLOOD WORK. SHE STATED SHE WANTS TO KNOW ABOUT THE RESULTS BECAUSE SHE QUIT TAKING HER BIRTH CONTROL. PLEASE CALL AND ADVISE.         "

## 2025-07-10 ENCOUNTER — LAB (OUTPATIENT)
Dept: LAB | Facility: HOSPITAL | Age: 30
End: 2025-07-10
Payer: COMMERCIAL

## 2025-07-10 DIAGNOSIS — Z00.00 ANNUAL PHYSICAL EXAM: ICD-10-CM

## 2025-07-10 DIAGNOSIS — Z01.89 PATIENT REQUEST FOR DIAGNOSTIC TESTING: ICD-10-CM

## 2025-07-10 LAB
ALBUMIN SERPL-MCNC: 4.6 G/DL (ref 3.5–5.2)
ALBUMIN/GLOB SERPL: 1.7 G/DL
ALP SERPL-CCNC: 61 U/L (ref 39–117)
ALT SERPL W P-5'-P-CCNC: 47 U/L (ref 1–33)
ANION GAP SERPL CALCULATED.3IONS-SCNC: 8.8 MMOL/L (ref 5–15)
AST SERPL-CCNC: 37 U/L (ref 1–32)
BACTERIA UR QL AUTO: NORMAL /HPF
BASOPHILS # BLD AUTO: 0.04 10*3/MM3 (ref 0–0.2)
BASOPHILS NFR BLD AUTO: 0.6 % (ref 0–1.5)
BILIRUB SERPL-MCNC: 0.4 MG/DL (ref 0–1.2)
BILIRUB UR QL STRIP: NEGATIVE
BUN SERPL-MCNC: 9 MG/DL (ref 6–20)
BUN/CREAT SERPL: 12.3 (ref 7–25)
CALCIUM SPEC-SCNC: 9.9 MG/DL (ref 8.6–10.5)
CHLORIDE SERPL-SCNC: 102 MMOL/L (ref 98–107)
CHOLEST SERPL-MCNC: 157 MG/DL (ref 0–200)
CLARITY UR: CLEAR
CO2 SERPL-SCNC: 27.2 MMOL/L (ref 22–29)
COLOR UR: YELLOW
CREAT SERPL-MCNC: 0.73 MG/DL (ref 0.57–1)
DEPRECATED RDW RBC AUTO: 42.3 FL (ref 37–54)
EGFRCR SERPLBLD CKD-EPI 2021: 113.6 ML/MIN/1.73
EOSINOPHIL # BLD AUTO: 0.02 10*3/MM3 (ref 0–0.4)
EOSINOPHIL NFR BLD AUTO: 0.3 % (ref 0.3–6.2)
ERYTHROCYTE [DISTWIDTH] IN BLOOD BY AUTOMATED COUNT: 12.7 % (ref 12.3–15.4)
FSH SERPL-ACNC: 7.06 MIU/ML
GLOBULIN UR ELPH-MCNC: 2.7 GM/DL
GLUCOSE SERPL-MCNC: 115 MG/DL (ref 65–99)
GLUCOSE UR STRIP-MCNC: NEGATIVE MG/DL
HCT VFR BLD AUTO: 37.6 % (ref 34–46.6)
HDLC SERPL QL: 2.28
HDLC SERPL-MCNC: 69 MG/DL (ref 40–60)
HGB BLD-MCNC: 12.5 G/DL (ref 12–15.9)
HGB UR QL STRIP.AUTO: ABNORMAL
HOLD SPECIMEN: NORMAL
HYALINE CASTS UR QL AUTO: NORMAL /LPF
IMM GRANULOCYTES # BLD AUTO: 0.02 10*3/MM3 (ref 0–0.05)
IMM GRANULOCYTES NFR BLD AUTO: 0.3 % (ref 0–0.5)
KETONES UR QL STRIP: NEGATIVE
LDLC SERPL CALC-MCNC: 69 MG/DL (ref 0–100)
LEUKOCYTE ESTERASE UR QL STRIP.AUTO: NEGATIVE
LH SERPL-ACNC: 8.76 MIU/ML
LYMPHOCYTES # BLD AUTO: 1.48 10*3/MM3 (ref 0.7–3.1)
LYMPHOCYTES NFR BLD AUTO: 21.8 % (ref 19.6–45.3)
MCH RBC QN AUTO: 30.3 PG (ref 26.6–33)
MCHC RBC AUTO-ENTMCNC: 33.2 G/DL (ref 31.5–35.7)
MCV RBC AUTO: 91.3 FL (ref 79–97)
MONOCYTES # BLD AUTO: 0.34 10*3/MM3 (ref 0.1–0.9)
MONOCYTES NFR BLD AUTO: 5 % (ref 5–12)
NEUTROPHILS NFR BLD AUTO: 4.89 10*3/MM3 (ref 1.7–7)
NEUTROPHILS NFR BLD AUTO: 72 % (ref 42.7–76)
NITRITE UR QL STRIP: NEGATIVE
NRBC BLD AUTO-RTO: 0 /100 WBC (ref 0–0.2)
PH UR STRIP.AUTO: 7.5 [PH] (ref 5–8)
PLATELET # BLD AUTO: 279 10*3/MM3 (ref 140–450)
PMV BLD AUTO: 10.3 FL (ref 6–12)
POTASSIUM SERPL-SCNC: 3.8 MMOL/L (ref 3.5–5.2)
PROT SERPL-MCNC: 7.3 G/DL (ref 6–8.5)
PROT UR QL STRIP: NEGATIVE
RBC # BLD AUTO: 4.12 10*6/MM3 (ref 3.77–5.28)
RBC # UR STRIP: NORMAL /HPF
REF LAB TEST METHOD: NORMAL
SODIUM SERPL-SCNC: 138 MMOL/L (ref 136–145)
SP GR UR STRIP: 1.01 (ref 1–1.03)
SQUAMOUS #/AREA URNS HPF: NORMAL /HPF
TRIGL SERPL-MCNC: 106 MG/DL (ref 0–150)
TSH SERPL DL<=0.05 MIU/L-ACNC: 0.81 UIU/ML (ref 0.27–4.2)
UROBILINOGEN UR QL STRIP: ABNORMAL
VLDLC SERPL-MCNC: 19 MG/DL (ref 5–40)
WBC # UR STRIP: NORMAL /HPF
WBC NRBC COR # BLD AUTO: 6.79 10*3/MM3 (ref 3.4–10.8)

## 2025-07-10 PROCEDURE — 81001 URINALYSIS AUTO W/SCOPE: CPT

## 2025-07-10 PROCEDURE — 83002 ASSAY OF GONADOTROPIN (LH): CPT

## 2025-07-10 PROCEDURE — 83001 ASSAY OF GONADOTROPIN (FSH): CPT

## 2025-07-10 PROCEDURE — 80061 LIPID PANEL: CPT

## 2025-07-10 PROCEDURE — 80050 GENERAL HEALTH PANEL: CPT

## 2025-07-10 PROCEDURE — 36415 COLL VENOUS BLD VENIPUNCTURE: CPT

## 2025-08-15 ENCOUNTER — LAB (OUTPATIENT)
Dept: LAB | Facility: HOSPITAL | Age: 30
End: 2025-08-15
Payer: COMMERCIAL

## 2025-08-15 ENCOUNTER — TELEPHONE (OUTPATIENT)
Dept: FAMILY MEDICINE CLINIC | Facility: CLINIC | Age: 30
End: 2025-08-15
Payer: COMMERCIAL

## 2025-08-15 DIAGNOSIS — R73.01 ELEVATED FASTING GLUCOSE: ICD-10-CM

## 2025-08-15 DIAGNOSIS — Z32.01 POSITIVE URINE PREGNANCY TEST: Primary | ICD-10-CM

## 2025-08-15 DIAGNOSIS — Z32.01 POSITIVE URINE PREGNANCY TEST: ICD-10-CM

## 2025-08-15 LAB
ALBUMIN SERPL-MCNC: 4.6 G/DL (ref 3.5–5.2)
ALBUMIN/GLOB SERPL: 1.7 G/DL
ALP SERPL-CCNC: 48 U/L (ref 39–117)
ALT SERPL W P-5'-P-CCNC: 29 U/L (ref 1–33)
ANION GAP SERPL CALCULATED.3IONS-SCNC: 11.9 MMOL/L (ref 5–15)
AST SERPL-CCNC: 30 U/L (ref 1–32)
BILIRUB SERPL-MCNC: 0.5 MG/DL (ref 0–1.2)
BUN SERPL-MCNC: 7 MG/DL (ref 6–20)
BUN/CREAT SERPL: 11.5 (ref 7–25)
CALCIUM SPEC-SCNC: 10 MG/DL (ref 8.6–10.5)
CHLORIDE SERPL-SCNC: 102 MMOL/L (ref 98–107)
CO2 SERPL-SCNC: 24.1 MMOL/L (ref 22–29)
CREAT SERPL-MCNC: 0.61 MG/DL (ref 0.57–1)
EGFRCR SERPLBLD CKD-EPI 2021: 123.5 ML/MIN/1.73
GLOBULIN UR ELPH-MCNC: 2.7 GM/DL
GLUCOSE SERPL-MCNC: 111 MG/DL (ref 65–99)
HCG SERPL QL: POSITIVE
POTASSIUM SERPL-SCNC: 3.9 MMOL/L (ref 3.5–5.2)
PROT SERPL-MCNC: 7.3 G/DL (ref 6–8.5)
SODIUM SERPL-SCNC: 138 MMOL/L (ref 136–145)

## 2025-08-15 PROCEDURE — 80053 COMPREHEN METABOLIC PANEL: CPT

## 2025-08-15 PROCEDURE — 84703 CHORIONIC GONADOTROPIN ASSAY: CPT

## 2025-08-15 PROCEDURE — 36415 COLL VENOUS BLD VENIPUNCTURE: CPT
